# Patient Record
Sex: FEMALE | Race: WHITE | NOT HISPANIC OR LATINO
[De-identification: names, ages, dates, MRNs, and addresses within clinical notes are randomized per-mention and may not be internally consistent; named-entity substitution may affect disease eponyms.]

---

## 2019-10-22 ENCOUNTER — APPOINTMENT (OUTPATIENT)
Dept: SURGERY | Facility: CLINIC | Age: 44
End: 2019-10-22
Payer: COMMERCIAL

## 2019-10-22 VITALS
HEART RATE: 89 BPM | BODY MASS INDEX: 36.63 KG/M2 | DIASTOLIC BLOOD PRESSURE: 85 MMHG | WEIGHT: 194 LBS | HEIGHT: 61 IN | RESPIRATION RATE: 16 BRPM | OXYGEN SATURATION: 98 % | TEMPERATURE: 98.8 F | SYSTOLIC BLOOD PRESSURE: 121 MMHG

## 2019-10-22 DIAGNOSIS — Z82.3 FAMILY HISTORY OF STROKE: ICD-10-CM

## 2019-10-22 DIAGNOSIS — Z78.9 OTHER SPECIFIED HEALTH STATUS: ICD-10-CM

## 2019-10-22 DIAGNOSIS — K80.20 CALCULUS OF GALLBLADDER W/OUT CHOLECYSTITIS W/OUT OBSTRUCTION: ICD-10-CM

## 2019-10-22 DIAGNOSIS — E11.01 TYPE 2 DIABETES MELLITUS WITH HYPEROSMOLARITY WITH COMA: ICD-10-CM

## 2019-10-22 DIAGNOSIS — Z00.00 ENCOUNTER FOR GENERAL ADULT MEDICAL EXAMINATION W/OUT ABNORMAL FINDINGS: ICD-10-CM

## 2019-10-22 DIAGNOSIS — R79.89 OTHER SPECIFIED ABNORMAL FINDINGS OF BLOOD CHEMISTRY: ICD-10-CM

## 2019-10-22 DIAGNOSIS — Z82.49 FAMILY HISTORY OF ISCHEMIC HEART DISEASE AND OTHER DISEASES OF THE CIRCULATORY SYSTEM: ICD-10-CM

## 2019-10-22 PROCEDURE — 99203 OFFICE O/P NEW LOW 30 MIN: CPT

## 2019-10-22 NOTE — HISTORY OF PRESENT ILLNESS
[de-identified] : Serenity is a 43 y/o female being seen for a follow-up visit, s/p lap band in 2007. S/p abdominoplasty 2012. She was last seen in the office on 09/14/15, which was also the time of her last band adjustment. She is interested in having a revision done, but is unsure what exactly she wants. Reports intermittent abdominal "discomfort" and feeling like something "is off." Symptoms began after abdominoplasty in 2012 an have become more intense over the years. She denies having any pain, but states she is always aware of her lap band port and occasional feels a pulling sensation there. She has never noticed skin changes at the site. She has not had any new issues with her band. She notes occasional vomiting with soft foods if she eats too fast, but otherwise is tolerating it well. Her weight has been relatively stable over the past 4 years. [de-identified] : see scanned note

## 2019-10-22 NOTE — ASSESSMENT
[FreeTextEntry1] : Status post lap band with weight regain and port site pain following abdominoplasty.\par Patient wants to lose a significant amount of weight in the least invasive way possible.

## 2019-10-22 NOTE — PHYSICAL EXAM
[Obese, well nourished, in no acute distress] : obese, well nourished, in no acute distress [Normal] : no JVD, no calf tenderness, venous stasis changes, varices [de-identified] : soft, nontender, nondistended, surgical scars; lap band port palpable in the left abdomen is nontender, skin intact

## 2019-10-22 NOTE — PLAN
[FreeTextEntry1] : Fluoroscopic exam of lap band port\par Adjustment of possible\par If any question of slip or port malfunction a CAT scan will be considered\par Strict diet and exercise advice provided\par Followup 2 months.

## 2019-11-11 ENCOUNTER — APPOINTMENT (OUTPATIENT)
Dept: RADIOLOGY | Facility: HOSPITAL | Age: 44
End: 2019-11-11

## 2019-12-30 ENCOUNTER — APPOINTMENT (OUTPATIENT)
Dept: RADIOLOGY | Facility: HOSPITAL | Age: 44
End: 2019-12-30

## 2020-01-09 ENCOUNTER — APPOINTMENT (OUTPATIENT)
Dept: SURGERY | Facility: CLINIC | Age: 45
End: 2020-01-09

## 2021-02-04 ENCOUNTER — APPOINTMENT (OUTPATIENT)
Dept: SURGERY | Facility: CLINIC | Age: 46
End: 2021-02-04
Payer: COMMERCIAL

## 2021-02-04 VITALS — BODY MASS INDEX: 38.64 KG/M2 | HEIGHT: 62 IN | WEIGHT: 210 LBS

## 2021-02-04 PROCEDURE — 99213 OFFICE O/P EST LOW 20 MIN: CPT | Mod: 95

## 2021-02-04 NOTE — REASON FOR VISIT
[Home] : at home, [unfilled] , at the time of the visit. [Medical Office: ([facility])___] : at the medical office located in  [Verbal consent obtained from patient] : the patient, [unfilled] [Gastric Banding] : gastric banding [de-identified] : 11/8/2007 [de-identified] : DOLV: 10/22/2019\par \par would like to get lap band adjustment

## 2021-02-04 NOTE — HISTORY OF PRESENT ILLNESS
[Procedure: ___] : Procedure performed: [unfilled]  [Date of Surgery: ___] : Date of Surgery:   [unfilled] [Surgeon Name:   ___] : Surgeon Name: Dr. DARLING [Pre-Op Weight ___] : Pre-op weight was [unfilled] lbs [___ Years Post Op] : [unfilled] years [Phase 4] : Phase 4 [___Kcal] : [unfilled] Kcal [___gm Protein] : [unfilled] gm protein [Walking] : walking

## 2021-02-04 NOTE — ASSESSMENT
[___ Years Post Op] : [unfilled] years [Previous Visit Weight: ___] : Previous visit weight: [unfilled] lbs [Today's Weight: ___] : Today's weight:[unfilled] lbs [de-identified] : Pt unable to exercise and has gained weight over past months.  Needs lap band adjustment under fluoroscopy due to not being seen for 18 months\par 1. Walking 30 min/day\par 2. Portion sizes\par 3. Vitamin D AND B12 supplements\par Follow-up 3 months after adjustment

## 2021-04-12 ENCOUNTER — APPOINTMENT (OUTPATIENT)
Dept: RADIOLOGY | Facility: HOSPITAL | Age: 46
End: 2021-04-12
Payer: COMMERCIAL

## 2021-04-12 ENCOUNTER — OUTPATIENT (OUTPATIENT)
Dept: OUTPATIENT SERVICES | Facility: HOSPITAL | Age: 46
LOS: 1 days | End: 2021-04-12
Payer: COMMERCIAL

## 2021-04-12 DIAGNOSIS — E03.9 HYPOTHYROIDISM, UNSPECIFIED: ICD-10-CM

## 2021-04-12 DIAGNOSIS — R79.89 OTHER SPECIFIED ABNORMAL FINDINGS OF BLOOD CHEMISTRY: ICD-10-CM

## 2021-04-12 DIAGNOSIS — E66.01 MORBID (SEVERE) OBESITY DUE TO EXCESS CALORIES: ICD-10-CM

## 2021-04-12 DIAGNOSIS — Z98.84 BARIATRIC SURGERY STATUS: ICD-10-CM

## 2021-04-12 DIAGNOSIS — E11.01 TYPE 2 DIABETES MELLITUS WITH HYPEROSMOLARITY WITH COMA: ICD-10-CM

## 2021-04-12 PROCEDURE — 74240 X-RAY XM UPR GI TRC 1CNTRST: CPT

## 2021-04-12 PROCEDURE — 43999 UNLISTED PROCEDURE STOMACH: CPT

## 2021-04-12 PROCEDURE — 74240 X-RAY XM UPR GI TRC 1CNTRST: CPT | Mod: 26

## 2021-04-12 PROCEDURE — 77002 NEEDLE LOCALIZATION BY XRAY: CPT

## 2021-04-13 LAB
25(OH)D3 SERPL-MCNC: 35.1 NG/ML
ALBUMIN SERPL ELPH-MCNC: 4.5 G/DL
ALP BLD-CCNC: 118 U/L
ALT SERPL-CCNC: 26 U/L
ANION GAP SERPL CALC-SCNC: 19 MMOL/L
AST SERPL-CCNC: 18 U/L
BASOPHILS # BLD AUTO: 0.08 K/UL
BASOPHILS NFR BLD AUTO: 0.7 %
BILIRUB SERPL-MCNC: 0.6 MG/DL
BUN SERPL-MCNC: 7 MG/DL
CALCIUM SERPL-MCNC: 9.7 MG/DL
CHLORIDE SERPL-SCNC: 102 MMOL/L
CO2 SERPL-SCNC: 21 MMOL/L
CREAT SERPL-MCNC: 0.63 MG/DL
EOSINOPHIL # BLD AUTO: 0.34 K/UL
EOSINOPHIL NFR BLD AUTO: 3.2 %
FOLATE SERPL-MCNC: 11.8 NG/ML
GLUCOSE SERPL-MCNC: 87 MG/DL
HCT VFR BLD CALC: 41.8 %
HGB BLD-MCNC: 13.1 G/DL
IMM GRANULOCYTES NFR BLD AUTO: 0.7 %
IRON SERPL-MCNC: 51 UG/DL
LYMPHOCYTES # BLD AUTO: 3.61 K/UL
LYMPHOCYTES NFR BLD AUTO: 33.7 %
MAN DIFF?: NORMAL
MCHC RBC-ENTMCNC: 27.3 PG
MCHC RBC-ENTMCNC: 31.3 GM/DL
MCV RBC AUTO: 87.1 FL
MONOCYTES # BLD AUTO: 0.6 K/UL
MONOCYTES NFR BLD AUTO: 5.6 %
NEUTROPHILS # BLD AUTO: 6 K/UL
NEUTROPHILS NFR BLD AUTO: 56.1 %
PLATELET # BLD AUTO: 394 K/UL
POTASSIUM SERPL-SCNC: 4.4 MMOL/L
PROT SERPL-MCNC: 7.8 G/DL
RBC # BLD: 4.8 M/UL
RBC # FLD: 13.2 %
SODIUM SERPL-SCNC: 142 MMOL/L
VIT B12 SERPL-MCNC: 560 PG/ML
WBC # FLD AUTO: 10.7 K/UL

## 2021-04-22 ENCOUNTER — APPOINTMENT (OUTPATIENT)
Dept: SURGERY | Facility: CLINIC | Age: 46
End: 2021-04-22
Payer: COMMERCIAL

## 2021-04-22 VITALS — WEIGHT: 225 LBS | HEIGHT: 62 IN | BODY MASS INDEX: 41.41 KG/M2

## 2021-04-22 DIAGNOSIS — E66.01 MORBID (SEVERE) OBESITY DUE TO EXCESS CALORIES: ICD-10-CM

## 2021-04-22 PROCEDURE — 99214 OFFICE O/P EST MOD 30 MIN: CPT | Mod: 95

## 2021-04-22 NOTE — REASON FOR VISIT
[Home] : at home, [unfilled] , at the time of the visit. [Medical Office: ([facility])___] : at the medical office located in  [Verbal consent obtained from patient] : the patient, [unfilled] [Gastric Banding] : gastric banding [de-identified] : 11/8/2007 [de-identified] : DOLV: 2/4/2021\par Attempt at lap band adjustment on 4/12/2021, unsuccessful. Extravasation of omnipaque, suggestive of a leak within the gastric port \par Vitamin levels all within normal range. Pt reports lapband is broken and she needs to get it removed\par Patient is interested in a 1 stage conversion to gastric sleeve.  Reviewed with her her notes from her endocrinologist who thought gastric sleeve was okay despite her history (per patient).  Patient understands that since she has been outside of the program for several years a complete reevaluation is necessary.\par She will undergo an upper GI series first and if okay will set her up for a 1 stage procedure.  If any abnormalities are noted then a two-stage procedure may be indicated.

## 2021-04-22 NOTE — ASSESSMENT
[___ Years Post Op] : [unfilled] years [Previous Visit Weight: ___] : Previous visit weight: [unfilled] lbs [Today's Weight: ___] : Today's weight:[unfilled] lbs [Today's BMI: ___] : Today's BMI: [unfilled] [de-identified] : Plan:\par Removal of lap band and port and conversion to gastric sleeve or gastric bypass as a 1 or two-stage procedure.  Patient will need to do the entire preoperative evaluation.\par \par The patient was given the following instructions:\par \par 1.	She needs a complete medical evaluation including echocardiogram, stress test, pulmonary function test and evaluation for sleep apnea.\par 2.	She needs an upper endoscopy.\par \par 3.	She needs dietary and psychological evaluations.\par \par 4.	She must attend a preoperative support group meeting.\par \par 5.	She must undergo a right upper quadrant ultrasound.\par \par The patient clearly understood that surgery would only be scheduled if there are no medical or psychiatric contraindications and a second office visit is required.  \par

## 2021-04-22 NOTE — PHYSICAL EXAM
I will SWITCH the dose or number of times a day I take the medications listed below when I get home from the hospital:  None [de-identified] : Telehealth

## 2021-04-22 NOTE — HISTORY OF PRESENT ILLNESS
[Procedure: ___] : Procedure performed: [unfilled]  [Date of Surgery: ___] : Date of Surgery:   [unfilled] [Surgeon Name:   ___] : Surgeon Name: Dr. DARLING [Pre-Op Weight ___] : Pre-op weight was [unfilled] lbs [___ Years Post Op] : [unfilled] years [Phase 4] : Phase 4 [___Kcal] : [unfilled] Kcal [___gm Protein] : [unfilled] gm protein [Walking] : walking [Diabetes] : Diabetes [Hypertension] : no Hypertension [Sleep Apnea] : no Sleep Apnea [GERD] : no GERD [Hyperlipidemia] : no Hyperlipidemia

## 2021-04-23 ENCOUNTER — NON-APPOINTMENT (OUTPATIENT)
Age: 46
End: 2021-04-23

## 2021-05-12 ENCOUNTER — RESULT REVIEW (OUTPATIENT)
Age: 46
End: 2021-05-12

## 2021-05-13 ENCOUNTER — APPOINTMENT (OUTPATIENT)
Dept: CARDIOLOGY | Facility: CLINIC | Age: 46
End: 2021-05-13
Payer: COMMERCIAL

## 2021-05-13 ENCOUNTER — NON-APPOINTMENT (OUTPATIENT)
Age: 46
End: 2021-05-13

## 2021-05-13 ENCOUNTER — APPOINTMENT (OUTPATIENT)
Dept: OBGYN | Facility: CLINIC | Age: 46
End: 2021-05-13
Payer: COMMERCIAL

## 2021-05-13 VITALS
SYSTOLIC BLOOD PRESSURE: 129 MMHG | DIASTOLIC BLOOD PRESSURE: 90 MMHG | OXYGEN SATURATION: 98 % | BODY MASS INDEX: 38.96 KG/M2 | WEIGHT: 213 LBS | HEART RATE: 90 BPM

## 2021-05-13 DIAGNOSIS — E03.9 HYPOTHYROIDISM, UNSPECIFIED: ICD-10-CM

## 2021-05-13 DIAGNOSIS — R06.00 DYSPNEA, UNSPECIFIED: ICD-10-CM

## 2021-05-13 DIAGNOSIS — R07.89 OTHER CHEST PAIN: ICD-10-CM

## 2021-05-13 PROCEDURE — 93000 ELECTROCARDIOGRAM COMPLETE: CPT

## 2021-05-13 PROCEDURE — 99244 OFF/OP CNSLTJ NEW/EST MOD 40: CPT

## 2021-05-13 PROCEDURE — 99386 PREV VISIT NEW AGE 40-64: CPT

## 2021-05-13 PROCEDURE — 99072 ADDL SUPL MATRL&STAF TM PHE: CPT

## 2021-05-13 RX ORDER — LEVOTHYROXINE SODIUM 0.15 MG/1
150 TABLET ORAL
Refills: 0 | Status: ACTIVE | COMMUNITY

## 2021-05-14 PROBLEM — R07.89 CHEST PAIN, ATYPICAL: Status: ACTIVE | Noted: 2021-05-13

## 2021-05-14 PROBLEM — E03.9 HYPOTHYROID: Status: ACTIVE | Noted: 2019-10-22

## 2021-05-14 PROBLEM — R06.00 DYSPNEA ON EXERTION: Status: ACTIVE | Noted: 2021-05-13

## 2021-05-14 NOTE — PHYSICAL EXAM
[General Appearance - Well Developed] : well developed [Normal Appearance] : normal appearance [Well Groomed] : well groomed [General Appearance - Well Nourished] : well nourished [No Deformities] : no deformities [General Appearance - In No Acute Distress] : no acute distress [Normal Conjunctiva] : the conjunctiva exhibited no abnormalities [Eyelids - No Xanthelasma] : the eyelids demonstrated no xanthelasmas [Normal Oral Mucosa] : normal oral mucosa [No Oral Pallor] : no oral pallor [No Oral Cyanosis] : no oral cyanosis [Normal Jugular Venous A Waves Present] : normal jugular venous A waves present [Normal Jugular Venous V Waves Present] : normal jugular venous V waves present [No Jugular Venous Ramon A Waves] : no jugular venous ramon A waves [Respiration, Rhythm And Depth] : normal respiratory rhythm and effort [Exaggerated Use Of Accessory Muscles For Inspiration] : no accessory muscle use [Auscultation Breath Sounds / Voice Sounds] : lungs were clear to auscultation bilaterally [Heart Rate And Rhythm] : heart rate and rhythm were normal [Heart Sounds] : normal S1 and S2 [Murmurs] : no murmurs present [Abdomen Soft] : soft [Abdomen Tenderness] : non-tender [Abdomen Mass (___ Cm)] : no abdominal mass palpated [Abnormal Walk] : normal gait [Gait - Sufficient For Exercise Testing] : the gait was sufficient for exercise testing [Nail Clubbing] : no clubbing of the fingernails [Cyanosis, Localized] : no localized cyanosis [Petechial Hemorrhages (___cm)] : no petechial hemorrhages [Skin Color & Pigmentation] : normal skin color and pigmentation [] : no rash [No Venous Stasis] : no venous stasis [Skin Lesions] : no skin lesions [No Skin Ulcers] : no skin ulcer [No Xanthoma] : no  xanthoma was observed [Oriented To Time, Place, And Person] : oriented to person, place, and time [Affect] : the affect was normal [Mood] : the mood was normal [No Anxiety] : not feeling anxious

## 2021-05-19 ENCOUNTER — OUTPATIENT (OUTPATIENT)
Dept: OUTPATIENT SERVICES | Facility: HOSPITAL | Age: 46
LOS: 1 days | End: 2021-05-19
Payer: COMMERCIAL

## 2021-05-19 ENCOUNTER — APPOINTMENT (OUTPATIENT)
Dept: RADIOLOGY | Facility: HOSPITAL | Age: 46
End: 2021-05-19
Payer: COMMERCIAL

## 2021-05-19 DIAGNOSIS — Z98.84 BARIATRIC SURGERY STATUS: ICD-10-CM

## 2021-05-19 DIAGNOSIS — R79.89 OTHER SPECIFIED ABNORMAL FINDINGS OF BLOOD CHEMISTRY: ICD-10-CM

## 2021-05-19 DIAGNOSIS — Z00.00 ENCOUNTER FOR GENERAL ADULT MEDICAL EXAMINATION WITHOUT ABNORMAL FINDINGS: ICD-10-CM

## 2021-05-19 DIAGNOSIS — E66.01 MORBID (SEVERE) OBESITY DUE TO EXCESS CALORIES: ICD-10-CM

## 2021-05-19 DIAGNOSIS — E03.9 HYPOTHYROIDISM, UNSPECIFIED: ICD-10-CM

## 2021-05-19 LAB
25(OH)D3 SERPL-MCNC: 34.6 NG/ML
ALBUMIN SERPL ELPH-MCNC: 4.2 G/DL
ALP BLD-CCNC: 100 U/L
ALT SERPL-CCNC: 20 U/L
ANION GAP SERPL CALC-SCNC: 14 MMOL/L
APTT BLD: 31.7 SEC
AST SERPL-CCNC: 13 U/L
BASOPHILS # BLD AUTO: 0.09 K/UL
BASOPHILS NFR BLD AUTO: 0.7 %
BILIRUB SERPL-MCNC: 0.5 MG/DL
BUN SERPL-MCNC: 10 MG/DL
CALCIUM SERPL-MCNC: 9.7 MG/DL
CHLORIDE SERPL-SCNC: 102 MMOL/L
CO2 SERPL-SCNC: 22 MMOL/L
CREAT SERPL-MCNC: 0.7 MG/DL
EOSINOPHIL # BLD AUTO: 0.33 K/UL
EOSINOPHIL NFR BLD AUTO: 2.5 %
ESTIMATED AVERAGE GLUCOSE: 114 MG/DL
FOLATE SERPL-MCNC: 17.4 NG/ML
GLUCOSE SERPL-MCNC: 118 MG/DL
HBA1C MFR BLD HPLC: 5.6 %
HCG SERPL QL: NEGATIVE
HCT VFR BLD CALC: 41.2 %
HGB BLD-MCNC: 13.2 G/DL
IMM GRANULOCYTES NFR BLD AUTO: 1.2 %
INR PPP: 0.95 RATIO
IRON SERPL-MCNC: 54 UG/DL
LYMPHOCYTES # BLD AUTO: 4.4 K/UL
LYMPHOCYTES NFR BLD AUTO: 33 %
MAN DIFF?: NORMAL
MCHC RBC-ENTMCNC: 27 PG
MCHC RBC-ENTMCNC: 32 GM/DL
MCV RBC AUTO: 84.4 FL
MONOCYTES # BLD AUTO: 0.79 K/UL
MONOCYTES NFR BLD AUTO: 5.9 %
NEUTROPHILS # BLD AUTO: 7.56 K/UL
NEUTROPHILS NFR BLD AUTO: 56.7 %
PAPP-A SERPL-ACNC: <1 MIU/ML
PLATELET # BLD AUTO: 421 K/UL
POTASSIUM SERPL-SCNC: 4.4 MMOL/L
PROT SERPL-MCNC: 7.2 G/DL
PT BLD: 11.3 SEC
RBC # BLD: 4.88 M/UL
RBC # FLD: 13.2 %
SODIUM SERPL-SCNC: 138 MMOL/L
TSH SERPL-ACNC: 0.71 UIU/ML
VIT B12 SERPL-MCNC: 516 PG/ML
WBC # FLD AUTO: 13.33 K/UL

## 2021-05-19 PROCEDURE — 74246 X-RAY XM UPR GI TRC 2CNTRST: CPT

## 2021-05-19 PROCEDURE — 74246 X-RAY XM UPR GI TRC 2CNTRST: CPT | Mod: 26

## 2021-05-24 LAB — VIT B1 SERPL-MCNC: 134.7 NMOL/L

## 2021-05-26 ENCOUNTER — APPOINTMENT (OUTPATIENT)
Dept: MAMMOGRAPHY | Facility: IMAGING CENTER | Age: 46
End: 2021-05-26
Payer: COMMERCIAL

## 2021-05-26 ENCOUNTER — RESULT REVIEW (OUTPATIENT)
Age: 46
End: 2021-05-26

## 2021-05-26 ENCOUNTER — OUTPATIENT (OUTPATIENT)
Dept: OUTPATIENT SERVICES | Facility: HOSPITAL | Age: 46
LOS: 1 days | End: 2021-05-26
Payer: COMMERCIAL

## 2021-05-26 ENCOUNTER — APPOINTMENT (OUTPATIENT)
Dept: ULTRASOUND IMAGING | Facility: IMAGING CENTER | Age: 46
End: 2021-05-26
Payer: COMMERCIAL

## 2021-05-26 DIAGNOSIS — Z00.8 ENCOUNTER FOR OTHER GENERAL EXAMINATION: ICD-10-CM

## 2021-05-26 PROCEDURE — 76641 ULTRASOUND BREAST COMPLETE: CPT

## 2021-05-26 PROCEDURE — 77067 SCR MAMMO BI INCL CAD: CPT | Mod: 26

## 2021-05-26 PROCEDURE — 77063 BREAST TOMOSYNTHESIS BI: CPT

## 2021-05-26 PROCEDURE — 76641 ULTRASOUND BREAST COMPLETE: CPT | Mod: 26,50

## 2021-05-26 PROCEDURE — 77063 BREAST TOMOSYNTHESIS BI: CPT | Mod: 26

## 2021-05-26 PROCEDURE — 77067 SCR MAMMO BI INCL CAD: CPT

## 2021-06-14 DIAGNOSIS — Z01.818 ENCOUNTER FOR OTHER PREPROCEDURAL EXAMINATION: ICD-10-CM

## 2021-06-15 ENCOUNTER — APPOINTMENT (OUTPATIENT)
Dept: DISASTER EMERGENCY | Facility: CLINIC | Age: 46
End: 2021-06-15

## 2021-06-18 ENCOUNTER — APPOINTMENT (OUTPATIENT)
Dept: CV DIAGNOSTICS | Facility: HOSPITAL | Age: 46
End: 2021-06-18

## 2021-06-18 ENCOUNTER — APPOINTMENT (OUTPATIENT)
Dept: CV DIAGNOSITCS | Facility: HOSPITAL | Age: 46
End: 2021-06-18

## 2021-07-07 ENCOUNTER — APPOINTMENT (OUTPATIENT)
Dept: CV DIAGNOSTICS | Facility: HOSPITAL | Age: 46
End: 2021-07-07

## 2021-07-07 ENCOUNTER — OUTPATIENT (OUTPATIENT)
Dept: OUTPATIENT SERVICES | Facility: HOSPITAL | Age: 46
LOS: 1 days | End: 2021-07-07
Payer: COMMERCIAL

## 2021-07-07 ENCOUNTER — APPOINTMENT (OUTPATIENT)
Dept: CV DIAGNOSITCS | Facility: HOSPITAL | Age: 46
End: 2021-07-07

## 2021-07-07 DIAGNOSIS — R07.89 OTHER CHEST PAIN: ICD-10-CM

## 2021-07-07 PROCEDURE — 93306 TTE W/DOPPLER COMPLETE: CPT

## 2021-07-07 PROCEDURE — 93016 CV STRESS TEST SUPVJ ONLY: CPT

## 2021-07-07 PROCEDURE — 93018 CV STRESS TEST I&R ONLY: CPT

## 2021-07-07 PROCEDURE — 93017 CV STRESS TEST TRACING ONLY: CPT

## 2021-07-07 PROCEDURE — 93306 TTE W/DOPPLER COMPLETE: CPT | Mod: 26

## 2021-07-09 NOTE — DISCUSSION/SUMMARY
[Procedure Intermediate Risk] : the procedure risk is intermediate [Additional Diagnostics Recommended] : additional diagnostics recommended [Patient Low Risk] : the patient is a low surgical risk [FreeTextEntry1] : TThe patient is a 45-year-old female hypothyroid, prior lap band awaiting gastric sleeve who gets dyspneic.\par #1 CV- normal ECG, echo and exercise stress test\par #2 Hypothyroid- continue levothyroxine\par #3 Surgery- broken lap band that needs to be removed and possible gastric sleeve. FInal clearance pending above. \par \par 7/9/21 Addendum: ECHO normal  Stress borderline BP otherwise negative\par There are no cardiac contraindications to bariatric surgery.

## 2021-07-09 NOTE — HISTORY OF PRESENT ILLNESS
[Preoperative Visit] : for a medical evaluation prior to surgery [Scheduled Procedure ___] : a [unfilled] [Date of Surgery ___] : on [unfilled] [Surgeon Name ___] : surgeon: [unfilled] [FreeTextEntry1] : Serenity is a 45-year-old female hypothyroid, prior lap band now pursuing gastric sleeve. THe band is broken and needs to be removed regardless. No exercise. No chest pain, palpitaitons but short of breath on stairs.

## 2021-07-09 NOTE — REVIEW OF SYSTEMS
[SOB] : shortness of breath [Dyspnea on exertion] : dyspnea during exertion [Negative] : Heme/Lymph [Chest Discomfort] : no chest discomfort [Lower Ext Edema] : no extremity edema [Leg Claudication] : no intermittent leg claudication [Palpitations] : no palpitations [Orthopnea] : no orthopnea [PND] : no PND [Syncope] : no syncope

## 2021-10-05 ENCOUNTER — APPOINTMENT (OUTPATIENT)
Dept: ULTRASOUND IMAGING | Facility: CLINIC | Age: 46
End: 2021-10-05
Payer: COMMERCIAL

## 2021-10-05 PROCEDURE — 76770 US EXAM ABDO BACK WALL COMP: CPT

## 2022-01-10 ENCOUNTER — APPOINTMENT (OUTPATIENT)
Dept: OPHTHALMOLOGY | Facility: CLINIC | Age: 47
End: 2022-01-10
Payer: COMMERCIAL

## 2022-01-10 ENCOUNTER — NON-APPOINTMENT (OUTPATIENT)
Age: 47
End: 2022-01-10

## 2022-01-10 PROCEDURE — 92083 EXTENDED VISUAL FIELD XM: CPT

## 2022-01-10 PROCEDURE — 99204 OFFICE O/P NEW MOD 45 MIN: CPT

## 2022-01-10 PROCEDURE — 92133 CPTRZD OPH DX IMG PST SGM ON: CPT

## 2022-01-13 ENCOUNTER — APPOINTMENT (OUTPATIENT)
Dept: SURGERY | Facility: CLINIC | Age: 47
End: 2022-01-13
Payer: COMMERCIAL

## 2022-01-13 VITALS
RESPIRATION RATE: 18 BRPM | DIASTOLIC BLOOD PRESSURE: 96 MMHG | TEMPERATURE: 97.3 F | OXYGEN SATURATION: 96 % | BODY MASS INDEX: 36.68 KG/M2 | WEIGHT: 207 LBS | HEIGHT: 63 IN | SYSTOLIC BLOOD PRESSURE: 150 MMHG | HEART RATE: 93 BPM

## 2022-01-13 DIAGNOSIS — K29.50 UNSPECIFIED CHRONIC GASTRITIS W/OUT BLEEDING: ICD-10-CM

## 2022-01-13 PROCEDURE — 99215 OFFICE O/P EST HI 40 MIN: CPT

## 2022-01-19 ENCOUNTER — OUTPATIENT (OUTPATIENT)
Dept: OUTPATIENT SERVICES | Facility: HOSPITAL | Age: 47
LOS: 1 days | End: 2022-01-19
Payer: COMMERCIAL

## 2022-01-19 VITALS
OXYGEN SATURATION: 100 % | RESPIRATION RATE: 16 BRPM | SYSTOLIC BLOOD PRESSURE: 138 MMHG | TEMPERATURE: 97 F | HEART RATE: 71 BPM | DIASTOLIC BLOOD PRESSURE: 90 MMHG | HEIGHT: 63 IN | WEIGHT: 212.08 LBS

## 2022-01-19 DIAGNOSIS — Z29.9 ENCOUNTER FOR PROPHYLACTIC MEASURES, UNSPECIFIED: ICD-10-CM

## 2022-01-19 DIAGNOSIS — E66.01 MORBID (SEVERE) OBESITY DUE TO EXCESS CALORIES: ICD-10-CM

## 2022-01-19 DIAGNOSIS — Z98.84 BARIATRIC SURGERY STATUS: ICD-10-CM

## 2022-01-19 DIAGNOSIS — Z01.818 ENCOUNTER FOR OTHER PREPROCEDURAL EXAMINATION: ICD-10-CM

## 2022-01-19 DIAGNOSIS — Z98.890 OTHER SPECIFIED POSTPROCEDURAL STATES: Chronic | ICD-10-CM

## 2022-01-19 LAB
A1C WITH ESTIMATED AVERAGE GLUCOSE RESULT: 5.8 % — HIGH (ref 4–5.6)
ALBUMIN SERPL ELPH-MCNC: 4.4 G/DL — SIGNIFICANT CHANGE UP (ref 3.3–5)
ALP SERPL-CCNC: 115 U/L — SIGNIFICANT CHANGE UP (ref 40–120)
ALT FLD-CCNC: 24 U/L — SIGNIFICANT CHANGE UP (ref 10–45)
ANION GAP SERPL CALC-SCNC: 14 MMOL/L — SIGNIFICANT CHANGE UP (ref 5–17)
AST SERPL-CCNC: 16 U/L — SIGNIFICANT CHANGE UP (ref 10–40)
BILIRUB SERPL-MCNC: 0.6 MG/DL — SIGNIFICANT CHANGE UP (ref 0.2–1.2)
BLD GP AB SCN SERPL QL: NEGATIVE — SIGNIFICANT CHANGE UP
BUN SERPL-MCNC: 11 MG/DL — SIGNIFICANT CHANGE UP (ref 7–23)
CALCIUM SERPL-MCNC: 9.4 MG/DL — SIGNIFICANT CHANGE UP (ref 8.4–10.5)
CHLORIDE SERPL-SCNC: 102 MMOL/L — SIGNIFICANT CHANGE UP (ref 96–108)
CO2 SERPL-SCNC: 22 MMOL/L — SIGNIFICANT CHANGE UP (ref 22–31)
CREAT SERPL-MCNC: 0.59 MG/DL — SIGNIFICANT CHANGE UP (ref 0.5–1.3)
ESTIMATED AVERAGE GLUCOSE: 120 MG/DL — HIGH (ref 68–114)
GLUCOSE SERPL-MCNC: 93 MG/DL — SIGNIFICANT CHANGE UP (ref 70–99)
HCT VFR BLD CALC: 42.6 % — SIGNIFICANT CHANGE UP (ref 34.5–45)
HGB BLD-MCNC: 13.2 G/DL — SIGNIFICANT CHANGE UP (ref 11.5–15.5)
MCHC RBC-ENTMCNC: 26.6 PG — LOW (ref 27–34)
MCHC RBC-ENTMCNC: 31 GM/DL — LOW (ref 32–36)
MCV RBC AUTO: 85.9 FL — SIGNIFICANT CHANGE UP (ref 80–100)
NRBC # BLD: 0 /100 WBCS — SIGNIFICANT CHANGE UP (ref 0–0)
PLATELET # BLD AUTO: 333 K/UL — SIGNIFICANT CHANGE UP (ref 150–400)
POTASSIUM SERPL-MCNC: 4.1 MMOL/L — SIGNIFICANT CHANGE UP (ref 3.5–5.3)
POTASSIUM SERPL-SCNC: 4.1 MMOL/L — SIGNIFICANT CHANGE UP (ref 3.5–5.3)
PROT SERPL-MCNC: 7.9 G/DL — SIGNIFICANT CHANGE UP (ref 6–8.3)
RBC # BLD: 4.96 M/UL — SIGNIFICANT CHANGE UP (ref 3.8–5.2)
RBC # FLD: 13.4 % — SIGNIFICANT CHANGE UP (ref 10.3–14.5)
RH IG SCN BLD-IMP: NEGATIVE — SIGNIFICANT CHANGE UP
SODIUM SERPL-SCNC: 138 MMOL/L — SIGNIFICANT CHANGE UP (ref 135–145)
WBC # BLD: 10.3 K/UL — SIGNIFICANT CHANGE UP (ref 3.8–10.5)
WBC # FLD AUTO: 10.3 K/UL — SIGNIFICANT CHANGE UP (ref 3.8–10.5)

## 2022-01-19 PROCEDURE — 85027 COMPLETE CBC AUTOMATED: CPT

## 2022-01-19 PROCEDURE — 86850 RBC ANTIBODY SCREEN: CPT

## 2022-01-19 PROCEDURE — 80053 COMPREHEN METABOLIC PANEL: CPT

## 2022-01-19 PROCEDURE — G0463: CPT

## 2022-01-19 PROCEDURE — 36415 COLL VENOUS BLD VENIPUNCTURE: CPT

## 2022-01-19 PROCEDURE — 86900 BLOOD TYPING SEROLOGIC ABO: CPT

## 2022-01-19 PROCEDURE — 86901 BLOOD TYPING SEROLOGIC RH(D): CPT

## 2022-01-19 PROCEDURE — 83036 HEMOGLOBIN GLYCOSYLATED A1C: CPT

## 2022-01-19 RX ORDER — CEFAZOLIN SODIUM 1 G
2000 VIAL (EA) INJECTION ONCE
Refills: 0 | Status: DISCONTINUED | OUTPATIENT
Start: 2022-02-09 | End: 2022-02-10

## 2022-01-19 RX ORDER — CHLORHEXIDINE GLUCONATE 213 G/1000ML
1 SOLUTION TOPICAL ONCE
Refills: 0 | Status: DISCONTINUED | OUTPATIENT
Start: 2022-02-09 | End: 2022-02-10

## 2022-01-19 NOTE — H&P PST ADULT - CARDIOVASCULAR SYMPTOMS
CROW when climbing 1 flight of stairs; Denies chest pain, CROW when walking 2-3 blocks, dyspnea, orthopnea, syncope or palpitations

## 2022-01-19 NOTE — H&P PST ADULT - HISTORY OF PRESENT ILLNESS
46 year old female with PMH Obesity (BMI 37.6), Laparoscopic Gastric Band (2008), Abdominoplasty (2012), Hypothyroidism and Lap. Cholecystectomy (~ 2005) reports that she has had difficulty losing weight and said that she recently had difficulty getting the lap band inflated. Pt now presents to PST for scheduled Robotic-Assisted Removal of Lap Band Port with conversion to laparoscopic sleeve gastrectomy on 2/9/22 with Dr. Ding.    Covid vaccine Pfizer 2nd dose received 5/10/21  Covid PCR test scheduled for 2/6/22 at [facility]  Denies Recent travel, Exposure or Covid symptoms

## 2022-01-19 NOTE — H&P PST ADULT - NEGATIVE CARDIOVASCULAR SYMPTOMS
no chest pain/no palpitations/no dyspnea on exertion no chest pain/no palpitations/no orthopnea/no paroxysmal nocturnal dyspnea/no peripheral edema/no claudication

## 2022-01-19 NOTE — H&P PST ADULT - ASSESSMENT
ELFEGOI VTE 2.0 SCORE [CLOT updated 2019]    AGE RELATED RISK FACTORS                                                       MOBILITY RELATED FACTORS  [X ] Age 41-60 years                                            (1 Point)                    [ ] Bed rest                                                        (1 Point)  [ ] Age: 61-74 years                                           (2 Points)                  [ ] Plaster cast                                                   (2 Points)  [ ] Age= 75 years                                              (3 Points)                    [ ] Bed bound for more than 72 hours                 (2 Points)    DISEASE RELATED RISK FACTORS                                               GENDER SPECIFIC FACTORS  [ ] Edema in the lower extremities                       (1 Point)              [ ] Pregnancy                                                     (1 Point)  [ ] Varicose veins                                               (1 Point)                     [ ] Post-partum < 6 weeks                                   (1 Point)             [X ] BMI > 25 Kg/m2                                            (1 Point)                     [ ] Hormonal therapy  or oral contraception          (1 Point)                 [ ] Sepsis (in the previous month)                        (1 Point)               [ ] History of pregnancy complications                 (1 point)  [ ] Pneumonia or serious lung disease                                               [ ] Unexplained or recurrent                     (1 Point)           (in the previous month)                               (1 Point)  [ ] Abnormal pulmonary function test                     (1 Point)                 SURGERY RELATED RISK FACTORS  [ ] Acute myocardial infarction                              (1 Point)               [ ]  Section                                             (1 Point)  [ ] Congestive heart failure (in the previous month)  (1 Point)      [ ] Minor surgery                                                  (1 Point)   [ ] Inflammatory bowel disease                             (1 Point)               [ ] Arthroscopic surgery                                        (2 Points)  [ ] Central venous access                                      (2 Points)                [X ] General surgery lasting more than 45 minutes (2 points)  [ ] Malignancy- Present or previous                   (2 Points)                [ ] Elective arthroplasty                                         (5 points)    [ ] Stroke (in the previous month)                          (5 Points)                                                                                                                                                           HEMATOLOGY RELATED FACTORS                                                 TRAUMA RELATED RISK FACTORS  [ ] Prior episodes of VTE                                     (3 Points)                [ ] Fracture of the hip, pelvis, or leg                       (5 Points)  [ ] Positive family history for VTE                         (3 Points)             [ ] Acute spinal cord injury (in the previous month)  (5 Points)  [ ] Prothrombin 68678 A                                     (3 Points)               [ ] Paralysis  (less than 1 month)                             (5 Points)  [ ] Factor V Leiden                                             (3 Points)                  [ ] Multiple Trauma within 1 month                        (5 Points)  [ ] Lupus anticoagulants                                     (3 Points)                                                           [ ] Anticardiolipin antibodies                               (3 Points)                                                       [ ] High homocysteine in the blood                      (3 Points)                                             [ ] Other congenital or acquired thrombophilia      (3 Points)                                                [ ] Heparin induced thrombocytopenia                  (3 Points)                                     Total Score [     4     ]

## 2022-01-19 NOTE — H&P PST ADULT - SYMPTOMS
none CROW when climbing 1 flight of stairs; Denies chest pain, CROW when walking 2-3 blocks, dyspnea, orthopnea, syncope or palpitations

## 2022-01-19 NOTE — H&P PST ADULT - RS GEN HX ROS MEA POS PC
CROW when climbing 1 flight of stairs; Denies chest pain, CRWO when walking 2-3 blocks, dyspnea, orthopnea, syncope or palpitations

## 2022-01-19 NOTE — H&P PST ADULT - NSICDXPASTSURGICALHX_GEN_ALL_CORE_FT
PAST SURGICAL HISTORY:  History of cholecystectomy possibly 2005    History of suburethral sling procedure 2010    Hx of laparoscopic gastric banding around 2008    S/P abdominoplasty 2012

## 2022-01-19 NOTE — H&P PST ADULT - FALL HARM RISK - UNIVERSAL INTERVENTIONS
Bed in lowest position, wheels locked, appropriate side rails in place/Call bell, personal items and telephone in reach/Instruct patient to call for assistance before getting out of bed or chair/Non-slip footwear when patient is out of bed/[place] to call system/Physically safe environment - no spills, clutter or unnecessary equipment/Purposeful Proactive Rounding/Room/bathroom lighting operational, light cord in reach

## 2022-01-19 NOTE — H&P PST ADULT - PROBLEM SELECTOR PLAN 1
Pt is scheduled for a Robotic-Assisted Removal of Lap band port with conversion to laparoscopic vertical sleeve gastrectomy on 2/9/22 with Dr. Toña Santana PCR test scheduled for 2/6/22 at [facility]  CBC, CMP, HGA1C, T/S ordered and obtained at [facility]  ABO, FS, Urine HCG on admit  Heparin ordered pre-operatively on DOS   Medication Reconciliation e-mailed to Pharmacist Devon Burger   Incentive spirometer instructions provided to pt with teach back demonstration

## 2022-01-19 NOTE — H&P PST ADULT - NSICDXPASTMEDICALHX_GEN_ALL_CORE_FT
PAST MEDICAL HISTORY:  Adult hypothyroidism     Adult-onset obesity     Chronic cystitis     Gastric band malfunction     History of papilledema Right eye; Followed by an opthomologist     PAST MEDICAL HISTORY:  Adult hypothyroidism     Adult-onset obesity BMI 37.6    Chronic cystitis on macrobid daily    Gastric band malfunction     History of papilledema Right eye; Followed by an opthomologist - No meds

## 2022-01-24 PROBLEM — K95.09 OTHER COMPLICATIONS OF GASTRIC BAND PROCEDURE: Chronic | Status: ACTIVE | Noted: 2022-01-19

## 2022-01-24 PROBLEM — N30.20 OTHER CHRONIC CYSTITIS WITHOUT HEMATURIA: Chronic | Status: ACTIVE | Noted: 2022-01-19

## 2022-01-24 PROBLEM — Z86.69 PERSONAL HISTORY OF OTHER DISEASES OF THE NERVOUS SYSTEM AND SENSE ORGANS: Chronic | Status: ACTIVE | Noted: 2022-01-19

## 2022-02-06 ENCOUNTER — OUTPATIENT (OUTPATIENT)
Dept: OUTPATIENT SERVICES | Facility: HOSPITAL | Age: 47
LOS: 1 days | End: 2022-02-06
Payer: COMMERCIAL

## 2022-02-06 DIAGNOSIS — Z11.52 ENCOUNTER FOR SCREENING FOR COVID-19: ICD-10-CM

## 2022-02-06 DIAGNOSIS — Z98.890 OTHER SPECIFIED POSTPROCEDURAL STATES: Chronic | ICD-10-CM

## 2022-02-06 LAB — SARS-COV-2 RNA SPEC QL NAA+PROBE: SIGNIFICANT CHANGE UP

## 2022-02-06 PROCEDURE — C9803: CPT

## 2022-02-06 PROCEDURE — U0003: CPT

## 2022-02-06 PROCEDURE — U0005: CPT

## 2022-02-07 NOTE — PHARMACOTHERAPY INTERVENTION NOTE - COMMENTS
Sleeve gastrectomy scheduled for 2/9/2022.  Patient medication reconciliation completed. Patient currently taking:     Home Medications:  Macrobid 100 mg oral capsule: 1 cap(s) orally 2 times a day   Synthroid 150 mcg (0.15 mg) oral tablet: 1 tab(s) orally once a day   Vitamin D3 125 mcg (5000 intl units) oral tablet: 1 tab(s) orally once a week     Patient was instructed to use crushed, dissolvable, chewable, or liquid formulations of medications for 1 month after surgery. Patient was informed to take daily multivitamins post surgically. Patient reeducated on NSAID avoidance (ibuprofen, ASA, naproxen, aleve) as they increase risk of GI bleeding; may use APAP for mild pain otherwise contact prescriber for consult. Patient was informed on indications and directions for administration for hyoscyamine SL, acetaminophen liquid, ondansetron ODT, and omeprazole DR. Patient was instructed to take the medications as follows:     Switch macrobid caps to nitrofurantoin 25mg/5mL - 20mL PO daily (100mg) - Rx sent by Dr. Thompson (urology), fosfomycin 3g packets as alternative to nitrofurantoin liquid if patient does not tolerate  Crush synthroid tabs  Continue vitamins/supplements in chewable/dissolvable forms     Devon Burger, DanielD, BCPS  [phone]  Available on [employer] Sleeve gastrectomy scheduled for 2/9/2022.  Patient medication reconciliation completed. Patient currently taking:     Home Medications:  Macrobid 100 mg oral capsule: 1 cap(s) orally once a day   Synthroid 150 mcg (0.15 mg) oral tablet: 1 tab(s) orally once a day   Vitamin D3 125 mcg (5000 intl units) oral tablet: 1 tab(s) orally once a week     Patient was instructed to use crushed, dissolvable, chewable, or liquid formulations of medications for 1 month after surgery. Patient was informed to take daily multivitamins post surgically. Patient reeducated on NSAID avoidance (ibuprofen, ASA, naproxen, aleve) as they increase risk of GI bleeding; may use APAP for mild pain otherwise contact prescriber for consult. Patient was informed on indications and directions for administration for hyoscyamine SL, acetaminophen liquid, ondansetron ODT, and omeprazole DR. Patient was instructed to take the medications as follows:     Switch macrobid caps to nitrofurantoin 25mg/5mL - 20mL PO daily (100mg) - Rx sent by Dr. Thompson (urology), fosfomycin 3g packets as alternative to nitrofurantoin liquid if patient does not tolerate  Crush synthroid tabs  Continue vitamins/supplements in chewable/dissolvable forms     Daniel DouglasD, BCPS  [phone]  Available on [employer] Teams

## 2022-02-08 ENCOUNTER — TRANSCRIPTION ENCOUNTER (OUTPATIENT)
Age: 47
End: 2022-02-08

## 2022-02-09 ENCOUNTER — APPOINTMENT (OUTPATIENT)
Dept: SURGERY | Facility: HOSPITAL | Age: 47
End: 2022-02-09
Payer: COMMERCIAL

## 2022-02-09 ENCOUNTER — RESULT REVIEW (OUTPATIENT)
Age: 47
End: 2022-02-09

## 2022-02-09 ENCOUNTER — INPATIENT (INPATIENT)
Facility: HOSPITAL | Age: 47
LOS: 0 days | Discharge: HOME | DRG: 328 | End: 2022-02-10
Attending: SURGERY | Admitting: SURGERY
Payer: COMMERCIAL

## 2022-02-09 VITALS
TEMPERATURE: 98 F | HEART RATE: 80 BPM | RESPIRATION RATE: 18 BRPM | HEIGHT: 62.99 IN | WEIGHT: 205.03 LBS | SYSTOLIC BLOOD PRESSURE: 121 MMHG | DIASTOLIC BLOOD PRESSURE: 79 MMHG | OXYGEN SATURATION: 98 %

## 2022-02-09 DIAGNOSIS — Z98.84 BARIATRIC SURGERY STATUS: ICD-10-CM

## 2022-02-09 DIAGNOSIS — E66.01 MORBID (SEVERE) OBESITY DUE TO EXCESS CALORIES: ICD-10-CM

## 2022-02-09 DIAGNOSIS — Z98.890 OTHER SPECIFIED POSTPROCEDURAL STATES: Chronic | ICD-10-CM

## 2022-02-09 LAB
ANION GAP SERPL CALC-SCNC: 15 MMOL/L — SIGNIFICANT CHANGE UP (ref 5–17)
BASOPHILS # BLD AUTO: 0.03 K/UL — SIGNIFICANT CHANGE UP (ref 0–0.2)
BASOPHILS NFR BLD AUTO: 0.2 % — SIGNIFICANT CHANGE UP (ref 0–2)
BUN SERPL-MCNC: 10 MG/DL — SIGNIFICANT CHANGE UP (ref 7–23)
CALCIUM SERPL-MCNC: 9.1 MG/DL — SIGNIFICANT CHANGE UP (ref 8.4–10.5)
CHLORIDE SERPL-SCNC: 102 MMOL/L — SIGNIFICANT CHANGE UP (ref 96–108)
CO2 SERPL-SCNC: 22 MMOL/L — SIGNIFICANT CHANGE UP (ref 22–31)
CREAT SERPL-MCNC: 0.73 MG/DL — SIGNIFICANT CHANGE UP (ref 0.5–1.3)
EOSINOPHIL # BLD AUTO: 0.03 K/UL — SIGNIFICANT CHANGE UP (ref 0–0.5)
EOSINOPHIL NFR BLD AUTO: 0.2 % — SIGNIFICANT CHANGE UP (ref 0–6)
GLUCOSE BLDC GLUCOMTR-MCNC: 100 MG/DL — HIGH (ref 70–99)
GLUCOSE SERPL-MCNC: 149 MG/DL — HIGH (ref 70–99)
HCG UR QL: NEGATIVE — SIGNIFICANT CHANGE UP
HCT VFR BLD CALC: 45.3 % — HIGH (ref 34.5–45)
HGB BLD-MCNC: 14 G/DL — SIGNIFICANT CHANGE UP (ref 11.5–15.5)
IMM GRANULOCYTES NFR BLD AUTO: 0.6 % — SIGNIFICANT CHANGE UP (ref 0–1.5)
LYMPHOCYTES # BLD AUTO: 1.41 K/UL — SIGNIFICANT CHANGE UP (ref 1–3.3)
LYMPHOCYTES # BLD AUTO: 7.5 % — LOW (ref 13–44)
MAGNESIUM SERPL-MCNC: 2.5 MG/DL — SIGNIFICANT CHANGE UP (ref 1.6–2.6)
MCHC RBC-ENTMCNC: 27 PG — SIGNIFICANT CHANGE UP (ref 27–34)
MCHC RBC-ENTMCNC: 30.9 GM/DL — LOW (ref 32–36)
MCV RBC AUTO: 87.5 FL — SIGNIFICANT CHANGE UP (ref 80–100)
MONOCYTES # BLD AUTO: 0.32 K/UL — SIGNIFICANT CHANGE UP (ref 0–0.9)
MONOCYTES NFR BLD AUTO: 1.7 % — LOW (ref 2–14)
NEUTROPHILS # BLD AUTO: 16.81 K/UL — HIGH (ref 1.8–7.4)
NEUTROPHILS NFR BLD AUTO: 89.8 % — HIGH (ref 43–77)
NRBC # BLD: 0 /100 WBCS — SIGNIFICANT CHANGE UP (ref 0–0)
PHOSPHATE SERPL-MCNC: 2.5 MG/DL — SIGNIFICANT CHANGE UP (ref 2.5–4.5)
PLATELET # BLD AUTO: 305 K/UL — SIGNIFICANT CHANGE UP (ref 150–400)
POTASSIUM SERPL-MCNC: 4.1 MMOL/L — SIGNIFICANT CHANGE UP (ref 3.5–5.3)
POTASSIUM SERPL-SCNC: 4.1 MMOL/L — SIGNIFICANT CHANGE UP (ref 3.5–5.3)
RBC # BLD: 5.18 M/UL — SIGNIFICANT CHANGE UP (ref 3.8–5.2)
RBC # FLD: 13.2 % — SIGNIFICANT CHANGE UP (ref 10.3–14.5)
SODIUM SERPL-SCNC: 139 MMOL/L — SIGNIFICANT CHANGE UP (ref 135–145)
WBC # BLD: 18.71 K/UL — HIGH (ref 3.8–10.5)
WBC # FLD AUTO: 18.71 K/UL — HIGH (ref 3.8–10.5)

## 2022-02-09 PROCEDURE — 43774 LAP RMVL GASTR ADJ ALL PARTS: CPT

## 2022-02-09 PROCEDURE — 43775 LAP SLEEVE GASTRECTOMY: CPT

## 2022-02-09 PROCEDURE — 88305 TISSUE EXAM BY PATHOLOGIST: CPT | Mod: 26

## 2022-02-09 DEVICE — CLIP APPLIER COVIDIEN ENDOCLIP II 10MM MED/LG: Type: IMPLANTABLE DEVICE | Status: FUNCTIONAL

## 2022-02-09 DEVICE — STAPLER COVIDIEN TRI-STAPLE 60MM PURPLE RELOAD: Type: IMPLANTABLE DEVICE | Status: FUNCTIONAL

## 2022-02-09 DEVICE — TISSEEL 4ML: Type: IMPLANTABLE DEVICE | Status: FUNCTIONAL

## 2022-02-09 DEVICE — STAPLER COVIDIEN TRI-STAPLE 45MM BLACK RELOAD: Type: IMPLANTABLE DEVICE | Status: FUNCTIONAL

## 2022-02-09 DEVICE — STAPLER COVIDIEN TRI-STAPLE 60MM BLACK INTELLIGENT RELOAD: Type: IMPLANTABLE DEVICE | Status: FUNCTIONAL

## 2022-02-09 RX ORDER — HYOSCYAMINE SULFATE 0.13 MG
0.12 TABLET ORAL EVERY 6 HOURS
Refills: 0 | Status: DISCONTINUED | OUTPATIENT
Start: 2022-02-09 | End: 2022-02-10

## 2022-02-09 RX ORDER — LIDOCAINE HCL 20 MG/ML
0.2 VIAL (ML) INJECTION ONCE
Refills: 0 | Status: DISCONTINUED | OUTPATIENT
Start: 2022-02-09 | End: 2022-02-09

## 2022-02-09 RX ORDER — ACETAMINOPHEN 500 MG
1000 TABLET ORAL ONCE
Refills: 0 | Status: COMPLETED | OUTPATIENT
Start: 2022-02-09 | End: 2022-02-09

## 2022-02-09 RX ORDER — FOLIC ACID 0.8 MG
1 TABLET ORAL ONCE
Refills: 0 | Status: COMPLETED | OUTPATIENT
Start: 2022-02-09 | End: 2022-02-09

## 2022-02-09 RX ORDER — OXYCODONE HYDROCHLORIDE 5 MG/1
2.5 TABLET ORAL EVERY 6 HOURS
Refills: 0 | Status: DISCONTINUED | OUTPATIENT
Start: 2022-02-09 | End: 2022-02-10

## 2022-02-09 RX ORDER — CEFAZOLIN SODIUM 1 G
2000 VIAL (EA) INJECTION EVERY 8 HOURS
Refills: 0 | Status: COMPLETED | OUTPATIENT
Start: 2022-02-09 | End: 2022-02-09

## 2022-02-09 RX ORDER — HEPARIN SODIUM 5000 [USP'U]/ML
5000 INJECTION INTRAVENOUS; SUBCUTANEOUS ONCE
Refills: 0 | Status: COMPLETED | OUTPATIENT
Start: 2022-02-09 | End: 2022-02-09

## 2022-02-09 RX ORDER — FOSAPREPITANT DIMEGLUMINE 150 MG/5ML
150 INJECTION, POWDER, LYOPHILIZED, FOR SOLUTION INTRAVENOUS ONCE
Refills: 0 | Status: COMPLETED | OUTPATIENT
Start: 2022-02-09 | End: 2022-02-09

## 2022-02-09 RX ORDER — ACETAMINOPHEN 500 MG
1000 TABLET ORAL ONCE
Refills: 0 | Status: COMPLETED | OUTPATIENT
Start: 2022-02-10 | End: 2022-02-10

## 2022-02-09 RX ORDER — PANTOPRAZOLE SODIUM 20 MG/1
40 TABLET, DELAYED RELEASE ORAL EVERY 24 HOURS
Refills: 0 | Status: DISCONTINUED | OUTPATIENT
Start: 2022-02-09 | End: 2022-02-10

## 2022-02-09 RX ORDER — KETOROLAC TROMETHAMINE 30 MG/ML
30 SYRINGE (ML) INJECTION EVERY 8 HOURS
Refills: 0 | Status: DISCONTINUED | OUTPATIENT
Start: 2022-02-09 | End: 2022-02-10

## 2022-02-09 RX ORDER — OXYCODONE HYDROCHLORIDE 5 MG/1
5 TABLET ORAL EVERY 6 HOURS
Refills: 0 | Status: DISCONTINUED | OUTPATIENT
Start: 2022-02-09 | End: 2022-02-10

## 2022-02-09 RX ORDER — SODIUM CHLORIDE 9 MG/ML
3 INJECTION INTRAMUSCULAR; INTRAVENOUS; SUBCUTANEOUS EVERY 8 HOURS
Refills: 0 | Status: DISCONTINUED | OUTPATIENT
Start: 2022-02-09 | End: 2022-02-09

## 2022-02-09 RX ORDER — NITROFURANTOIN MACROCRYSTAL 50 MG
1 CAPSULE ORAL
Qty: 0 | Refills: 0 | DISCHARGE

## 2022-02-09 RX ORDER — THIAMINE MONONITRATE (VIT B1) 100 MG
100 TABLET ORAL ONCE
Refills: 0 | Status: COMPLETED | OUTPATIENT
Start: 2022-02-09 | End: 2022-02-09

## 2022-02-09 RX ORDER — HEPARIN SODIUM 5000 [USP'U]/ML
5000 INJECTION INTRAVENOUS; SUBCUTANEOUS EVERY 8 HOURS
Refills: 0 | Status: DISCONTINUED | OUTPATIENT
Start: 2022-02-09 | End: 2022-02-10

## 2022-02-09 RX ORDER — SODIUM CHLORIDE 9 MG/ML
1000 INJECTION, SOLUTION INTRAVENOUS
Refills: 0 | Status: DISCONTINUED | OUTPATIENT
Start: 2022-02-09 | End: 2022-02-10

## 2022-02-09 RX ORDER — ONDANSETRON 8 MG/1
4 TABLET, FILM COATED ORAL EVERY 6 HOURS
Refills: 0 | Status: DISCONTINUED | OUTPATIENT
Start: 2022-02-09 | End: 2022-02-10

## 2022-02-09 RX ORDER — HALOPERIDOL DECANOATE 100 MG/ML
0.5 INJECTION INTRAMUSCULAR EVERY 8 HOURS
Refills: 0 | Status: DISCONTINUED | OUTPATIENT
Start: 2022-02-09 | End: 2022-02-09

## 2022-02-09 RX ORDER — SODIUM CHLORIDE 9 MG/ML
1000 INJECTION, SOLUTION INTRAVENOUS
Refills: 0 | Status: DISCONTINUED | OUTPATIENT
Start: 2022-02-09 | End: 2022-02-09

## 2022-02-09 RX ADMIN — Medication 0.12 MILLIGRAM(S): at 17:49

## 2022-02-09 RX ADMIN — Medication 30 MILLIGRAM(S): at 17:59

## 2022-02-09 RX ADMIN — PANTOPRAZOLE SODIUM 40 MILLIGRAM(S): 20 TABLET, DELAYED RELEASE ORAL at 12:04

## 2022-02-09 RX ADMIN — SODIUM CHLORIDE 150 MILLILITER(S): 9 INJECTION, SOLUTION INTRAVENOUS at 11:25

## 2022-02-09 RX ADMIN — SODIUM CHLORIDE 150 MILLILITER(S): 9 INJECTION, SOLUTION INTRAVENOUS at 22:28

## 2022-02-09 RX ADMIN — ONDANSETRON 4 MILLIGRAM(S): 8 TABLET, FILM COATED ORAL at 17:49

## 2022-02-09 RX ADMIN — Medication 100 MILLIGRAM(S): at 12:05

## 2022-02-09 RX ADMIN — Medication 400 MILLIGRAM(S): at 22:19

## 2022-02-09 RX ADMIN — Medication 0.12 MILLIGRAM(S): at 23:17

## 2022-02-09 RX ADMIN — FOSAPREPITANT DIMEGLUMINE 300 MILLIGRAM(S): 150 INJECTION, POWDER, LYOPHILIZED, FOR SOLUTION INTRAVENOUS at 12:46

## 2022-02-09 RX ADMIN — Medication 30 MILLIGRAM(S): at 22:20

## 2022-02-09 RX ADMIN — Medication 100 MILLIGRAM(S): at 23:17

## 2022-02-09 RX ADMIN — ONDANSETRON 4 MILLIGRAM(S): 8 TABLET, FILM COATED ORAL at 23:17

## 2022-02-09 RX ADMIN — HEPARIN SODIUM 5000 UNIT(S): 5000 INJECTION INTRAVENOUS; SUBCUTANEOUS at 06:44

## 2022-02-09 RX ADMIN — Medication 1 MILLIGRAM(S): at 12:46

## 2022-02-09 RX ADMIN — Medication 100 MILLIGRAM(S): at 17:44

## 2022-02-09 RX ADMIN — Medication 0.12 MILLIGRAM(S): at 12:14

## 2022-02-09 RX ADMIN — HEPARIN SODIUM 5000 UNIT(S): 5000 INJECTION INTRAVENOUS; SUBCUTANEOUS at 22:20

## 2022-02-09 RX ADMIN — ONDANSETRON 4 MILLIGRAM(S): 8 TABLET, FILM COATED ORAL at 12:05

## 2022-02-09 RX ADMIN — SODIUM CHLORIDE 250 MILLILITER(S): 9 INJECTION, SOLUTION INTRAVENOUS at 12:47

## 2022-02-09 RX ADMIN — Medication 400 MILLIGRAM(S): at 17:44

## 2022-02-09 NOTE — CHART NOTE - NSCHARTNOTEFT_GEN_A_CORE
POST-OPERATIVE CHECK    SUBJECTIVE  Patient is s/p robot-assisted laparoscopic removal of gastric band with conversion to sleeve gastrectomy. Pt c/o abd pain. Denies flatus. Denies nausea, vomiting. Tolerating sips.     Vital Signs Last 24 Hrs  T(C): 36.2 (09 Feb 2022 12:00), Max: 36.4 (09 Feb 2022 06:38)  T(F): 97.2 (09 Feb 2022 12:00), Max: 97.5 (09 Feb 2022 06:38)  HR: 101 (09 Feb 2022 14:00) (80 - 101)  BP: 142/65 (09 Feb 2022 14:00) (119/59 - 144/63)  BP(mean): 94 (09 Feb 2022 14:00) (83 - 108)  RR: 18 (09 Feb 2022 14:00) (17 - 21)  SpO2: 96% (09 Feb 2022 14:00) (94% - 98%)  I&O's Detail    09 Feb 2022 07:01  -  09 Feb 2022 14:44  --------------------------------------------------------  IN:    Lactated Ringers: 450 mL  Total IN: 450 mL    OUT:    Voided (mL): 350 mL  Total OUT: 350 mL    Total NET: 100 mL        ceFAZolin   IVPB 2000  ceFAZolin   IVPB 2000  ceFAZolin   IVPB 2000  ceFAZolin   IVPB 2000  heparin   Injectable 5000    PAST MEDICAL & SURGICAL HISTORY:  Adult hypothyroidism    Adult-onset obesity  BMI 37.6    Gastric band malfunction    Chronic cystitis  on macrobid daily    History of papilledema  Right eye; Followed by an opthomologist - No meds    History of cholecystectomy  possibly 2005    Hx of laparoscopic gastric banding  around 2008    S/P abdominoplasty  2012    History of suburethral sling procedure  2010        PHYSICAL EXAM  General: NAD  Pulmonary: Nonlabored breathing, no respiratory distress  Abdominal: soft, generalized tenderness worst in epigastric area, port site dressings without strikethrough  Extremities: WWP    LABS                        14.0   18.71 )-----------( 305      ( 09 Feb 2022 11:29 )             45.3     02-09    139  |  102  |  10  ----------------------------<  149<H>  4.1   |  22  |  0.73    Ca    9.1      09 Feb 2022 11:29  Phos  2.5     02-09  Mg     2.5     02-09        CAPILLARY BLOOD GLUCOSE      POCT Blood Glucose.: 100 mg/dL (09 Feb 2022 06:29)      IMAGING    ASSESSMENT  46F with PMHx Obesity, Laparoscopic Gastric Band (2008), Abdominoplasty (2012). Now s/p removal of gastric band with conversion to sleeve gastrectomy.    PLAN  - Diet: CLD gama when tolerating sips  - Pain control as needed  - DVT ppx  - OOB and ambulating as tolerated  - F/u AM labs POST-OPERATIVE CHECK    SUBJECTIVE  Patient is s/p robot-assisted laparoscopic removal of gastric band with conversion to sleeve gastrectomy. Pt c/o abd pain. Denies flatus. Denies nausea, vomiting. Tolerating sips.     Vital Signs Last 24 Hrs  T(C): 36.2 (09 Feb 2022 12:00), Max: 36.4 (09 Feb 2022 06:38)  T(F): 97.2 (09 Feb 2022 12:00), Max: 97.5 (09 Feb 2022 06:38)  HR: 101 (09 Feb 2022 14:00) (80 - 101)  BP: 142/65 (09 Feb 2022 14:00) (119/59 - 144/63)  BP(mean): 94 (09 Feb 2022 14:00) (83 - 108)  RR: 18 (09 Feb 2022 14:00) (17 - 21)  SpO2: 96% (09 Feb 2022 14:00) (94% - 98%)  I&O's Detail    09 Feb 2022 07:01  -  09 Feb 2022 14:44  --------------------------------------------------------  IN:    Lactated Ringers: 450 mL  Total IN: 450 mL    OUT:    Voided (mL): 350 mL  Total OUT: 350 mL    Total NET: 100 mL        ceFAZolin   IVPB 2000  ceFAZolin   IVPB 2000  ceFAZolin   IVPB 2000  ceFAZolin   IVPB 2000  heparin   Injectable 5000    PAST MEDICAL & SURGICAL HISTORY:  Adult hypothyroidism    Adult-onset obesity  BMI 37.6    Gastric band malfunction    Chronic cystitis  on macrobid daily    History of papilledema  Right eye; Followed by an opthomologist - No meds    History of cholecystectomy  possibly 2005    Hx of laparoscopic gastric banding  around 2008    S/P abdominoplasty  2012    History of suburethral sling procedure  2010        PHYSICAL EXAM  General: NAD  Pulmonary: Nonlabored breathing, no respiratory distress  Abdominal: soft, generalized tenderness worst in epigastric area, port site dressings without strikethrough  Extremities: WWP    LABS                        14.0   18.71 )-----------( 305      ( 09 Feb 2022 11:29 )             45.3     02-09    139  |  102  |  10  ----------------------------<  149<H>  4.1   |  22  |  0.73    Ca    9.1      09 Feb 2022 11:29  Phos  2.5     02-09  Mg     2.5     02-09        CAPILLARY BLOOD GLUCOSE      POCT Blood Glucose.: 100 mg/dL (09 Feb 2022 06:29)      IMAGING    ASSESSMENT  46F with PMHx Obesity, Laparoscopic Gastric Band (2008), Abdominoplasty (2012). Now s/p removal of gastric band with conversion to sleeve gastrectomy.    PLAN  - Diet: advance CLD gama  - Pain control as needed  - DVT ppx  - OOB and ambulating as tolerated  - F/u AM labs    [facility]  [id number]

## 2022-02-09 NOTE — BRIEF OPERATIVE NOTE - OPERATION/FINDINGS
36" Bougie  Reinforced Staplers  LAGB removed incl all components  No HHernia  Ports x 3 x 8 mm  1x 15 mm port fascial closure

## 2022-02-09 NOTE — BRIEF OPERATIVE NOTE - NSICDXBRIEFPREOP_GEN_ALL_CORE_FT
PRE-OP DIAGNOSIS:  Morbid or severe obesity due to excess calories 09-Feb-2022 10:55:50  Juan C Rich

## 2022-02-09 NOTE — PRE-OP CHECKLIST - ORDERS/MEDICATION ADMINISTRATION RECORD ON CHART
-- DO NOT REPLY / DO NOT REPLY ALL --  -- Message is from the [facility]--    COVID-19 Universal Screening: N/A - Not about scheduling    General Patient Message      Reason for Call: Patients daughter has a questions regarding patients results for UTI. Patient will be having a CT scan but has blood in her urine and she shouldn't solution required for Ct scan.     Caller Information       Type Contact Phone    09/02/2020 09:28 AM CDT Phone (Incoming) BLAYNE PARTIDA (Emergency Contact) [phone]          Alternative phone number: n/a    Turnaround time given to caller:   \"This message will be sent to [state Provider's name]. The clinical team will fulfill your request as soon as they review your message.\"     done

## 2022-02-09 NOTE — BRIEF OPERATIVE NOTE - NSICDXBRIEFPROCEDURE_GEN_ALL_CORE_FT
PROCEDURES:  Robot-assisted laparoscopic removal of adjustable gastric restrictive device and subcutaneous port components 09-Feb-2022 10:55:00  Juan C Rich  Robot-assisted laparoscopic removal of gastric band with conversion to sleeve gastrectomy using da Enrrique Xi 09-Feb-2022 10:55:35  Juan C Rich

## 2022-02-09 NOTE — BRIEF OPERATIVE NOTE - NSICDXBRIEFPOSTOP_GEN_ALL_CORE_FT
POST-OP DIAGNOSIS:  Morbid or severe obesity due to excess calories 09-Feb-2022 10:55:59  Juan C Rich

## 2022-02-09 NOTE — PATIENT PROFILE ADULT - FALL HARM RISK - UNIVERSAL INTERVENTIONS
Bed in lowest position, wheels locked, appropriate side rails in place/Call bell, personal items and telephone in reach/Instruct patient to call for assistance before getting out of bed or chair/Non-slip footwear when patient is out of bed/Searcy to call system/Physically safe environment - no spills, clutter or unnecessary equipment/Purposeful Proactive Rounding/Room/bathroom lighting operational, light cord in reach

## 2022-02-09 NOTE — BRIEF OPERATIVE NOTE - URINE OUTPUT
Wanted to let  know that Pt was supposed to get her shots next week but woke up today with green boogies again so mother wants to hold off - Wants  to know that as soon as this clears up she will be in for boosters 
0

## 2022-02-10 ENCOUNTER — TRANSCRIPTION ENCOUNTER (OUTPATIENT)
Age: 47
End: 2022-02-10

## 2022-02-10 VITALS
SYSTOLIC BLOOD PRESSURE: 135 MMHG | HEART RATE: 63 BPM | TEMPERATURE: 97 F | DIASTOLIC BLOOD PRESSURE: 73 MMHG | OXYGEN SATURATION: 95 % | RESPIRATION RATE: 18 BRPM

## 2022-02-10 LAB
ANION GAP SERPL CALC-SCNC: 10 MMOL/L — SIGNIFICANT CHANGE UP (ref 5–17)
BASOPHILS # BLD AUTO: 0.01 K/UL — SIGNIFICANT CHANGE UP (ref 0–0.2)
BASOPHILS NFR BLD AUTO: 0.1 % — SIGNIFICANT CHANGE UP (ref 0–2)
BUN SERPL-MCNC: 8 MG/DL — SIGNIFICANT CHANGE UP (ref 7–23)
CALCIUM SERPL-MCNC: 8.8 MG/DL — SIGNIFICANT CHANGE UP (ref 8.4–10.5)
CHLORIDE SERPL-SCNC: 106 MMOL/L — SIGNIFICANT CHANGE UP (ref 96–108)
CO2 SERPL-SCNC: 24 MMOL/L — SIGNIFICANT CHANGE UP (ref 22–31)
CREAT SERPL-MCNC: 0.64 MG/DL — SIGNIFICANT CHANGE UP (ref 0.5–1.3)
EOSINOPHIL # BLD AUTO: 0 K/UL — SIGNIFICANT CHANGE UP (ref 0–0.5)
EOSINOPHIL NFR BLD AUTO: 0 % — SIGNIFICANT CHANGE UP (ref 0–6)
GLUCOSE SERPL-MCNC: 105 MG/DL — HIGH (ref 70–99)
HCT VFR BLD CALC: 35.6 % — SIGNIFICANT CHANGE UP (ref 34.5–45)
HCT VFR BLD CALC: 39.1 % — SIGNIFICANT CHANGE UP (ref 34.5–45)
HGB BLD-MCNC: 11.2 G/DL — LOW (ref 11.5–15.5)
HGB BLD-MCNC: 11.9 G/DL — SIGNIFICANT CHANGE UP (ref 11.5–15.5)
IMM GRANULOCYTES NFR BLD AUTO: 0.8 % — SIGNIFICANT CHANGE UP (ref 0–1.5)
LYMPHOCYTES # BLD AUTO: 1.97 K/UL — SIGNIFICANT CHANGE UP (ref 1–3.3)
LYMPHOCYTES # BLD AUTO: 12.5 % — LOW (ref 13–44)
MCHC RBC-ENTMCNC: 26.6 PG — LOW (ref 27–34)
MCHC RBC-ENTMCNC: 27.2 PG — SIGNIFICANT CHANGE UP (ref 27–34)
MCHC RBC-ENTMCNC: 30.4 GM/DL — LOW (ref 32–36)
MCHC RBC-ENTMCNC: 31.5 GM/DL — LOW (ref 32–36)
MCV RBC AUTO: 86.4 FL — SIGNIFICANT CHANGE UP (ref 80–100)
MCV RBC AUTO: 87.5 FL — SIGNIFICANT CHANGE UP (ref 80–100)
MONOCYTES # BLD AUTO: 0.8 K/UL — SIGNIFICANT CHANGE UP (ref 0–0.9)
MONOCYTES NFR BLD AUTO: 5.1 % — SIGNIFICANT CHANGE UP (ref 2–14)
NEUTROPHILS # BLD AUTO: 12.82 K/UL — HIGH (ref 1.8–7.4)
NEUTROPHILS NFR BLD AUTO: 81.5 % — HIGH (ref 43–77)
NRBC # BLD: 0 /100 WBCS — SIGNIFICANT CHANGE UP (ref 0–0)
NRBC # BLD: 0 /100 WBCS — SIGNIFICANT CHANGE UP (ref 0–0)
PLATELET # BLD AUTO: 302 K/UL — SIGNIFICANT CHANGE UP (ref 150–400)
PLATELET # BLD AUTO: 324 K/UL — SIGNIFICANT CHANGE UP (ref 150–400)
POTASSIUM SERPL-MCNC: 3.6 MMOL/L — SIGNIFICANT CHANGE UP (ref 3.5–5.3)
POTASSIUM SERPL-SCNC: 3.6 MMOL/L — SIGNIFICANT CHANGE UP (ref 3.5–5.3)
RBC # BLD: 4.12 M/UL — SIGNIFICANT CHANGE UP (ref 3.8–5.2)
RBC # BLD: 4.47 M/UL — SIGNIFICANT CHANGE UP (ref 3.8–5.2)
RBC # FLD: 13.2 % — SIGNIFICANT CHANGE UP (ref 10.3–14.5)
RBC # FLD: 13.3 % — SIGNIFICANT CHANGE UP (ref 10.3–14.5)
SODIUM SERPL-SCNC: 140 MMOL/L — SIGNIFICANT CHANGE UP (ref 135–145)
WBC # BLD: 14.46 K/UL — HIGH (ref 3.8–10.5)
WBC # BLD: 15.72 K/UL — HIGH (ref 3.8–10.5)
WBC # FLD AUTO: 14.46 K/UL — HIGH (ref 3.8–10.5)
WBC # FLD AUTO: 15.72 K/UL — HIGH (ref 3.8–10.5)

## 2022-02-10 PROCEDURE — 85025 COMPLETE CBC W/AUTO DIFF WBC: CPT

## 2022-02-10 PROCEDURE — C1889: CPT

## 2022-02-10 PROCEDURE — 80048 BASIC METABOLIC PNL TOTAL CA: CPT

## 2022-02-10 PROCEDURE — C9399: CPT

## 2022-02-10 PROCEDURE — 83735 ASSAY OF MAGNESIUM: CPT

## 2022-02-10 PROCEDURE — 85027 COMPLETE CBC AUTOMATED: CPT

## 2022-02-10 PROCEDURE — 81025 URINE PREGNANCY TEST: CPT

## 2022-02-10 PROCEDURE — 82962 GLUCOSE BLOOD TEST: CPT

## 2022-02-10 PROCEDURE — 36415 COLL VENOUS BLD VENIPUNCTURE: CPT

## 2022-02-10 PROCEDURE — 84100 ASSAY OF PHOSPHORUS: CPT

## 2022-02-10 PROCEDURE — 88305 TISSUE EXAM BY PATHOLOGIST: CPT

## 2022-02-10 PROCEDURE — S2900: CPT

## 2022-02-10 RX ORDER — ONDANSETRON 8 MG/1
1 TABLET, FILM COATED ORAL
Qty: 21 | Refills: 0
Start: 2022-02-10

## 2022-02-10 RX ORDER — HYOSCYAMINE SULFATE 0.13 MG
1 TABLET ORAL
Qty: 28 | Refills: 0
Start: 2022-02-10

## 2022-02-10 RX ORDER — OMEPRAZOLE 10 MG/1
1 CAPSULE, DELAYED RELEASE ORAL
Qty: 30 | Refills: 0
Start: 2022-02-10 | End: 2022-03-11

## 2022-02-10 RX ORDER — ACETAMINOPHEN 500 MG
15 TABLET ORAL
Qty: 300 | Refills: 0
Start: 2022-02-10

## 2022-02-10 RX ADMIN — Medication 400 MILLIGRAM(S): at 10:06

## 2022-02-10 RX ADMIN — ONDANSETRON 4 MILLIGRAM(S): 8 TABLET, FILM COATED ORAL at 12:51

## 2022-02-10 RX ADMIN — Medication 0.12 MILLIGRAM(S): at 12:51

## 2022-02-10 RX ADMIN — Medication 30 MILLIGRAM(S): at 05:31

## 2022-02-10 RX ADMIN — ONDANSETRON 4 MILLIGRAM(S): 8 TABLET, FILM COATED ORAL at 05:31

## 2022-02-10 RX ADMIN — Medication 0.12 MILLIGRAM(S): at 05:31

## 2022-02-10 RX ADMIN — PANTOPRAZOLE SODIUM 40 MILLIGRAM(S): 20 TABLET, DELAYED RELEASE ORAL at 12:51

## 2022-02-10 RX ADMIN — HEPARIN SODIUM 5000 UNIT(S): 5000 INJECTION INTRAVENOUS; SUBCUTANEOUS at 05:31

## 2022-02-10 RX ADMIN — Medication 400 MILLIGRAM(S): at 03:38

## 2022-02-10 NOTE — PROGRESS NOTE ADULT - SUBJECTIVE AND OBJECTIVE BOX
SURGERY DAILY PROGRESS NOTE:       SUBJECTIVE/ROS: No acute overnight events. Patient seen and examined at bedside during morning rounds.    OBJECTIVE:    Vital Signs Last 24 Hrs  T(C): 36.7 (10 Feb 2022 00:50), Max: 37.1 (09 Feb 2022 20:48)  T(F): 98.1 (10 Feb 2022 00:50), Max: 98.7 (09 Feb 2022 20:48)  HR: 81 (10 Feb 2022 00:50) (80 - 111)  BP: 126/73 (10 Feb 2022 00:50) (119/59 - 159/97)  BP(mean): 94 (09 Feb 2022 14:00) (83 - 108)  RR: 18 (10 Feb 2022 00:50) (17 - 21)  SpO2: 93% (10 Feb 2022 00:50) (92% - 98%)    I&O's Detail    09 Feb 2022 07:01  -  10 Feb 2022 04:24  --------------------------------------------------------  IN:    IV PiggyBack: 50 mL    IV PiggyBack: 200 mL    Lactated Ringers: 450 mL    Oral Fluid: 60 mL  Total IN: 760 mL    OUT:    Voided (mL): 950 mL  Total OUT: 950 mL    Total NET: -190 mL    PHYSICAL EXAM  General: NAD  Pulmonary: Nonlabored breathing, no respiratory distress  Abdominal: soft, generalized tenderness worst in epigastric area, port site dressings without strikethrough  Extremities: [facility]    LABS:                        14.0   18.71 )-----------( 305      ( 09 Feb 2022 11:29 )             45.3     02-09    139  |  102  |  10  ----------------------------<  149<H>  4.1   |  22  |  0.73    Ca    9.1      09 Feb 2022 11:29  Phos  2.5     02-09  Mg     2.5     02-09                           SURGERY DAILY PROGRESS NOTE:     SUBJECTIVE/ROS: No acute overnight events. Patient seen and examined at bedside during morning rounds.    OBJECTIVE:    Vital Signs Last 24 Hrs  T(C): 36.7 (10 Feb 2022 00:50), Max: 37.1 (09 Feb 2022 20:48)  T(F): 98.1 (10 Feb 2022 00:50), Max: 98.7 (09 Feb 2022 20:48)  HR: 81 (10 Feb 2022 00:50) (80 - 111)  BP: 126/73 (10 Feb 2022 00:50) (119/59 - 159/97)  BP(mean): 94 (09 Feb 2022 14:00) (83 - 108)  RR: 18 (10 Feb 2022 00:50) (17 - 21)  SpO2: 93% (10 Feb 2022 00:50) (92% - 98%)    I&O's Detail    09 Feb 2022 07:01  -  10 Feb 2022 04:24  --------------------------------------------------------  IN:    IV PiggyBack: 50 mL    IV PiggyBack: 200 mL    Lactated Ringers: 450 mL    Oral Fluid: 60 mL  Total IN: 760 mL    OUT:    Voided (mL): 950 mL  Total OUT: 950 mL    Total NET: -190 mL    PHYSICAL EXAM  General: NAD  Pulmonary: Nonlabored breathing, no respiratory distress  Abdominal: soft, NT, port site dressings without strikethrough  Extremities: [facility]    LABS:                        14.0   18.71 )-----------( 305      ( 09 Feb 2022 11:29 )             45.3     02-09    139  |  102  |  10  ----------------------------<  149<H>  4.1   |  22  |  0.73    Ca    9.1      09 Feb 2022 11:29  Phos  2.5     02-09  Mg     2.5     02-09

## 2022-02-10 NOTE — DISCHARGE NOTE NURSING/CASE MANAGEMENT/SOCIAL WORK - PATIENT PORTAL LINK FT
You can access the FollowMyHealth Patient Portal offered by [facility] by registering at the following website: [email or web address]. By joining [facility]’s FollowMyHealth portal, you will also be able to view your health information using other applications (apps) compatible with our system.

## 2022-02-10 NOTE — PROGRESS NOTE ADULT - ATTENDING COMMENTS
I saw and examined the patient during morning rounds, and reviewed  the history, operative findings and data with the patient and staff  Agree with note which was also reviewed and edited where appropriate.  D/W patient, RN, residents and Fellow    Doing well, POD#1 s/p RA-removal of lap band and port and conversion to LVSG.  Continue postop protocol.  Dietary changes reinforced.  Home when meets d/c criteria.  f/u appointment confirmed.

## 2022-02-10 NOTE — DISCHARGE NOTE PROVIDER - NSDCCPTREATMENT_GEN_ALL_CORE_FT
PRINCIPAL PROCEDURE  Procedure: Robot-assisted laparoscopic removal of adjustable gastric restrictive device and subcutaneous port components  Findings and Treatment: analgesia, baritric diet, increae water intake, follow up care      SECONDARY PROCEDURE  Procedure: Robot-assisted laparoscopic removal of gastric band with conversion to sleeve gastrectomy using da Enrrique Xi  Findings and Treatment:

## 2022-02-10 NOTE — DISCHARGE NOTE PROVIDER - HOSPITAL COURSE
On 2/09/22 Ms Castellon who has a history of morbid obesity BMI 36.6 underwent Laparoscopic Sleeve Gastrectomy. Bariatric ERAS protocol followed to include preoperative and perioperative use of DVT and SSI prophylaxis as well as multi-modal non-opioid analgesia. Patient tolerated the procedure well  extubated in the operating room then transferred to the PACU in stable condition. Once hemodynamically stable and she had effective pain control, she ambulated with assistance. Pt was also able to void within 4 hours following the procedure. She was later transferred to a bariatric unit and placed on bedside continuous pulse oximetry. Postoperative pain management included IV multi-modal non-opioid analgesics. The patient started tolerating bariatric clear liquid the evening of surgery.    On POD #1 patient remained stable with no acute events overnight and had effective pain control, de nies nausea and vomiting. Patient is ambulating independently and voiding as expected. The rest of her hospital course was uneventful, patient met 8-Point Bariatric Surgery D/C criteria and subsequently cleared for discharge home on POD#1. No extended VTE prophylaxis was required ([facility] VTE Risk Stratification Risk low, <1% ). The patient will follow a protocol-derived staged meal progression supervised by the dietitian in the outpatient setting. Patient will follow-up with Dr. Ding in 7-10 days, medical doctor and dietitian in 30 days. [facility] from [facility] prior to discharge. Written discharge instruction explained and given to include VTE prevention.   On 2/09/22 Ms Castellon who has a history of morbid obesity BMI 36.6 underwent Laparoscopic Removal of Gastric Band to Sleeve Gastrectomy. Bariatric ERAS protocol followed to include preoperative and perioperative use of DVT and SSI prophylaxis as well as multi-modal non-opioid analgesia. Patient tolerated the procedure well  extubated in the operating room then transferred to the PACU in stable condition. Once hemodynamically stable and she had effective pain control, she ambulated with assistance. Pt was also able to void within 4 hours following the procedure. She was later transferred to a bariatric unit and placed on bedside continuous pulse oximetry. Postoperative pain management included IV multi-modal non-opioid analgesics. The patient started tolerating bariatric clear liquid the evening of surgery.    On POD #1 patient remained stable with no acute events overnight and had effective pain control, de nies nausea and vomiting. Patient is ambulating independently and voiding as expected. The rest of her hospital course was uneventful, patient met 8-Point Bariatric Surgery D/C criteria and subsequently cleared for discharge home on POD#1. No extended VTE prophylaxis was required ([facility] VTE Risk Stratification Risk low, <1% ). The patient will follow a protocol-derived staged meal progression supervised by the dietitian in the outpatient setting. Postoperative complications reviewed. Patient advised to follow-up with Dr. Ding in 7-10 days, medical doctor and dietitian in 30 days. All appropriate prescriptions obtained from [facility] prior to discharge, medication reviewed with side effect, . Written discharge instruction explained and given to include VTE prevention.

## 2022-02-10 NOTE — DIETITIAN INITIAL EVALUATION ADULT. - CHIEF COMPLAINT
This is a "46F with PMHx Obesity, Laparoscopic Gastric Band (2008), Abdominoplasty (2012). Now POD#1 s/p removal of gastric band with conversion to sleeve gastrectomy."

## 2022-02-10 NOTE — DISCHARGE NOTE PROVIDER - NSDCCPCAREPLAN_GEN_ALL_CORE_FT
PRINCIPAL DISCHARGE DIAGNOSIS  Diagnosis: Morbid (severe) obesity due to excess calories  Assessment and Plan of Treatment: recovering from surgery

## 2022-02-10 NOTE — PROGRESS NOTE ADULT - ASSESSMENT
46F with PMHx Obesity, Laparoscopic Gastric Band (2008), Abdominoplasty (2012). Now s/p removal of gastric band with conversion to sleeve gastrectomy.    PLAN  - Diet: advance CLD gama  - IVF: LR @ 150  - Pain control: Tyl / Toradol, oxy 2.5 / 5  - DVT ppx  - OOB and ambulating as tolerated  - F/u AM labs    [facility]  [id number] 46F with PMHx Obesity, Laparoscopic Gastric Band (2008), Abdominoplasty (2012). Now POD#1 s/p removal of gastric band with conversion to sleeve gastrectomy.    PLAN  - Diet: advance CLD gama  - IVF: LR @ 150  - Pain control: Tyl / Toradol, oxy 2.5 / 5  - DVT ppx  - OOB and ambulating as tolerated  - F/u AM labs    [facility]  [id number] 46F with PMHx Obesity, Laparoscopic Gastric Band (2008), Abdominoplasty (2012). Now POD#1 s/p removal of gastric band with conversion to sleeve gastrectomy.    PLAN  - Diet: advance CLD gama  - IVF: LR @ 150  - Pain control: Tyl / Toradol, oxy 2.5 / 5  - DVT ppx  - OOB and ambulating as tolerated  - F/u AM labs  - Discharge: today if tolerates liquids    [facility]  [id number]

## 2022-02-10 NOTE — DISCHARGE NOTE PROVIDER - NSDCFUADDINST_GEN_ALL_CORE_FT
Bariatric phase 1 full liquid diet starting tomorrow for 2 weeks, then thin puree for 4 weeks.  No carbonated beverages. Avoid liquids with sugar and carbohydrates. Follow up with your dietician in 30 days.      Additional instructions as explained and outlined the gastric sleeve instructions. No exercise, no heavy lifting, call office for shortness for breath chest pain, calf pain and selling, increase pain and drainage from abdominal incision sites. Bariatric full diet as described above.  Do not take any whole large pills.  Please crush medications, open granules or take suspensions. Call office [phone] for immediate medical/surgical concerns. bariatric surgery, bariatric diet, increase water intake, nutritional supplement, follow up care, physical activity.

## 2022-02-10 NOTE — PHARMACOTHERAPY INTERVENTION NOTE - COMMENTS
Spoke to patient about absorption issues with medications and the need to crush tablets or open capsules for the next 30 days. Reinforced discussion points from previous counseling. Informed patient of new prescriptions sent to pharmacy. Pill  provided.    No pre-op opioids  Post-op opioids:    PACU: 0 MME    2 Scotty: 0 MME  Home: 0 MME     Devon Burger, PharmD, BCPS  [phone]  Available on [employer] Teams

## 2022-02-10 NOTE — DISCHARGE NOTE PROVIDER - NSDCMRMEDTOKEN_GEN_ALL_CORE_FT
acetaminophen 500 mg/15 mL oral liquid: 15 milliliter(s) orally every 6 hours MDD:as needed for pain  Levsin SL 0.125 mg sublingual tablet: 1 tab(s) sublingually every 6 hours MDD:as needed for spasms/pain  Macrobid 100 mg oral capsule: 1 cap(s) orally once a day  omeprazole 40 mg oral delayed release capsule: 1 cap(s) orally once a day MDD:open capsule into applesause  ondansetron 4 mg oral disintegrating strip: 1 each orally 3 times a day MDD:as needed for nausea  Synthroid 150 mcg (0.15 mg) oral tablet: 1 tab(s) orally once a day  Vitamin D3 125 mcg (5000 intl units) oral tablet: 1 tab(s) orally once a week

## 2022-02-10 NOTE — DISCHARGE NOTE PROVIDER - CARE PROVIDER_API CALL
Marc Ding)  Surgery  [address] [place] [address] [id number]  Phone: [phone]  Fax: [phone]  Follow Up Time: 2 weeks

## 2022-02-10 NOTE — DISCHARGE NOTE PROVIDER - NSDCFUSCHEDAPPT_GEN_ALL_CORE_FT
JOAN SINGER ; 02/18/2022 ; [facility] [address]  JOAN SINGER ; 03/10/2022 ; [facility] [address]  JOAN SINGER ; 03/15/2022 ; [facility] [address]

## 2022-02-10 NOTE — DIETITIAN INITIAL EVALUATION ADULT. - OTHER INFO
Pt with multiple previous wt loss attempts per chart and was unable to lose and maintain significant wt loss. Pt with Lap band placed in 2008, noted with difficulty losing weight and getting lap band inflated. Pt now s/p lap band removal with conversion to sleeve. Per chart, pt met with outpatient RD and weighed 219.8  pounds (5/30/21). Pre-surgical wt (H&P) noted as 212pounds (1/19/22). Current wt of 204.6 pounds (2/9/22). Pt now S/P laparoscopic vertical sleeve gastrectomy. Pt denies N+V, sipping on bariatric clear liquids during RD visit. Pt with knowledge of bariatric full liquid diet and receptive to in depth review/reinforcement. Pt reports home stock of protein shakes with plans to purchase more. Pt reports plan to purchase the necessary vitamins/minerals in chewable/liquid/crushable form including a multivitamin with added elemental iron, calcium citrate with vitamin D, and sublingual B12. Pt was advised to take the multivitamin with elemental iron at least 2 hours apart from the calcium citrate with vitamin D for optimal absorption. Pt plans to schedule a follow up appointment with outpatient RD. Pt able to teach back all points discussed during interview.     Diet Education  1. Laparoscopic  sleeve gastrectomy recommendations reviewed/reinforced (discharge instruction handout references). Bariatric full liquid diet reviewed- sugar free, caffeine free, no carbonated beverages, low fat, no straws, no chewing gum, 6 small meals/day gradually increasing volume, and sipping beverages slowly, no water during meals- drink water 1 hour before or after meals, meeting hydration and protein needs. Discussed vitamin/mineral supplement compliance as discussed above. 2. RD to remain available to reinforce nutrition education as requested by patient/family/caregiver.

## 2022-02-10 NOTE — PROGRESS NOTE ADULT - SUBJECTIVE AND OBJECTIVE BOX
Post Op Day#: 1    Subjective: "Feeling good, mild discomfort. no N/V."    Objective: No acute events overnight. Continuous pulse oximetry at bedside functioning,  v/s stable, afebrile. Mild leukocytosis, afebrile.  Ambulating independently.  independently around the unit last night. Started bariatric clear liquid diet and tolerating it. Voiding as expected.                                              Vital Signs Last 24 Hrs  T(C): 36.8 (10 Feb 2022 05:34), Max: 37.1 (09 Feb 2022 20:48)  T(F): 98.2 (10 Feb 2022 05:34), Max: 98.7 (09 Feb 2022 20:48)  HR: 75 (10 Feb 2022 05:34) (75 - 111)  BP: 107/69 (10 Feb 2022 05:34) (107/69 - 159/97)  BP(mean): 94 (09 Feb 2022 14:00) (83 - 108)  RR: 18 (10 Feb 2022 05:34) (17 - 21)  SpO2: 94% (10 Feb 2022 05:34) (92% - 98%)                                               I&O's Summary    09 Feb 2022 07:01  -  10 Feb 2022 07:00  --------------------------------------------------------  IN: 2560 mL / OUT: 950 mL / NET: 1610 mL                                                                          11.9   15.72 )-----------( 324      ( 10 Feb 2022 07:09 )             39.1                                                 02-10    140  |  106  |  8   ----------------------------<  105<H>  3.6   |  24  |  0.64    Ca    8.8      10 Feb 2022 07:09  Phos  2.5     02-09  Mg     2.5     02-09      acetaminophen   IVPB .. 1000 milliGRAM(s) IV Intermittent once  ceFAZolin   IVPB 2000 milliGRAM(s) IV Intermittent once  chlorhexidine 2% Cloths 1 Application(s) Topical once  heparin   Injectable 5000 Unit(s) SubCutaneous every 8 hours  hyoscyamine SL 0.125 milliGRAM(s) SubLingual every 6 hours  lactated ringers. 1000 milliLiter(s) IV Continuous <Continuous>  LORazepam   Injectable 0.5 milliGRAM(s) IV Push once PRN  ondansetron Injectable 4 milliGRAM(s) IV Push every 6 hours  oxyCODONE    IR 2.5 milliGRAM(s) Oral every 6 hours PRN  oxyCODONE    IR 5 milliGRAM(s) Oral every 6 hours PRN  pantoprazole  Injectable 40 milliGRAM(s) IV Push every 24 hours  sodium chloride 0.9% 1000 milliLiter(s) IV Continuous <Continuous>      Physical Exam:         Lungs:  clear breath sounds b/l       Heart:  Regular rate & rhythm       Abdomen:  Soft, non-distended.  Scopes sites clean, dry and intact. + bs, - flatus, no rebound or guarding       Skin:  intact, pannus w/o rash       Extremities: + pulses, no edema, no calf tenderness, negative jeannette's     Extended VTE Risk Assessment Scores             Caprini VTE Risk Score:                                                               1-2 (low), Perioperative and Postoperative VTE prophylaxis   [facility]  VTE Predicted RISK Low:   (<1%), No post D/C.extended VTE prophylaxis      Assessment and Plan: Lap Sleeve 1Gastrectomy POD # 1    - Repeat CBC at noon  - Bariatric Clear diet today then protocol derived staged meal progression supervised by RD in outpatient setting  - DVT/GI prophylaxis, Incentive spirometry  - Ambulate as tolerated  -  Procedure specific education including diet, postoperative complications, medications and side effects, vitamins and VTE prevention. Written materials given  - Medication reviewed and reconciled, Bariatric meds obtained from [facility] prior to d/c  -  D/C home once Bariatric 8-Point d/c criteria met  -  Follow up with Dr Ding in 7-10 days, Dietitian and PMD in 30 days.      Mary Pimentel, CRISTHIAN, ANP  [phone]

## 2022-02-10 NOTE — DIETITIAN INITIAL EVALUATION ADULT. - PERTINENT LABORATORY DATA
02-10 @ 07:09: Na 140, BUN 8, Cr 0.64, <H>, K+ 3.6, Phos --, Mg --, Alk Phos --, ALT/SGPT --, AST/SGOT --, HbA1c --

## 2022-02-10 NOTE — DISCHARGE NOTE NURSING/CASE MANAGEMENT/SOCIAL WORK - NSDCPEFALRISK_GEN_ALL_CORE
For information on Fall & Injury Prevention, visit: [email or web address] OR  [email or web address] OR  https://www.cdc.gov/steadi/patient.html

## 2022-02-18 ENCOUNTER — APPOINTMENT (OUTPATIENT)
Dept: SURGERY | Facility: CLINIC | Age: 47
End: 2022-02-18
Payer: COMMERCIAL

## 2022-02-18 VITALS
RESPIRATION RATE: 17 BRPM | DIASTOLIC BLOOD PRESSURE: 83 MMHG | WEIGHT: 192.5 LBS | HEIGHT: 62 IN | HEART RATE: 88 BPM | SYSTOLIC BLOOD PRESSURE: 116 MMHG | OXYGEN SATURATION: 100 % | TEMPERATURE: 97.6 F | BODY MASS INDEX: 35.43 KG/M2

## 2022-02-18 PROCEDURE — 99024 POSTOP FOLLOW-UP VISIT: CPT

## 2022-02-18 NOTE — PLAN
[FreeTextEntry1] : Continue postop liquid diet for 2 weeks postoperatively.  Can advance to puréed foods at that time.\par Medications should be in liquid formulation if possible.\par Encouraged frequent ambulation and at least 60 ounces of fluids per day.\par Follow-up scheduled with Dr. Ding next month.

## 2022-02-18 NOTE — ASSESSMENT
[FreeTextEntry1] : 46-year-old female recovering well status post laparoscopic removal of lap band and conversion to sleeve gastrectomy with Dr. Ding.\par Reactive hypersensitivity at incision sites secondary to dressings.  No evidence of infection.

## 2022-02-18 NOTE — HISTORY OF PRESENT ILLNESS
[de-identified] : JOAN is a 46 year old female here for postop visit s/p removal of Lap-Band and port and conversion to robot-assisted laparoscopic vertical sleeve gastrectomy on 02/09/22 with Dr. Ding.\par She is overall doing well, tolerating liquid diet, reports no dysphagia or abdominal pain.  Her incisions are slightly itchy.\par She is concerned about her chronic cystitis, for which she usually takes oral Macrobid.  She is taking the liquid formulation of the antibiotic currently.

## 2022-02-18 NOTE — PHYSICAL EXAM
[Obese, well nourished, in no acute distress] : obese, well nourished, in no acute distress [Normal] : PERRL, EOMI, no conjunctival infection, anicteric [de-identified] : Normal respirations [de-identified] : Soft, nontender, nondistended.  Incisions well-healed.  There is some mild blanching erythema around the incisions without induration or drainage.  This is consistent with likely allergic response to surgical dressings.

## 2022-02-22 LAB — SURGICAL PATHOLOGY STUDY: SIGNIFICANT CHANGE UP

## 2022-03-10 ENCOUNTER — APPOINTMENT (OUTPATIENT)
Dept: SURGERY | Facility: CLINIC | Age: 47
End: 2022-03-10
Payer: COMMERCIAL

## 2022-03-10 VITALS
HEIGHT: 62 IN | OXYGEN SATURATION: 99 % | SYSTOLIC BLOOD PRESSURE: 130 MMHG | BODY MASS INDEX: 35.33 KG/M2 | WEIGHT: 192 LBS | HEART RATE: 88 BPM | TEMPERATURE: 98.6 F | DIASTOLIC BLOOD PRESSURE: 89 MMHG | RESPIRATION RATE: 18 BRPM

## 2022-03-10 DIAGNOSIS — Z09 ENCOUNTER FOR FOLLOW-UP EXAMINATION AFTER COMPLETED TREATMENT FOR CONDITIONS OTHER THAN MALIGNANT NEOPLASM: ICD-10-CM

## 2022-03-10 DIAGNOSIS — Z98.84 BARIATRIC SURGERY STATUS: ICD-10-CM

## 2022-03-10 PROCEDURE — 99024 POSTOP FOLLOW-UP VISIT: CPT

## 2022-03-10 NOTE — REASON FOR VISIT
[Gastric Sleeve] : gastric sleeve [de-identified] : 2/9/2022 [de-identified] : DOLV: 2/18/2022 with Dr. Hernandez \par \par No complaints\par Happy with results to date\par No nausea/vomiting\par No chest pain/shortness of breath\par No fever/chills

## 2022-03-10 NOTE — ASSESSMENT
[___ Months Post Op] : [unfilled] months [Previous Visit Weight: ___] : Previous visit weight: [unfilled] lbs [Cormobidities: ___] : Comorbidities: [unfilled] [Today's Weight: ___] : Today's weight:[unfilled] lbs [Today's BMI: ___] : Today's BMI: [unfilled] [de-identified] : Plan:\par Advance to phase 4\par Increase activities as tolerated\par Continue to take vitamins\par Avoid carbohydrates\par Follow-up 2 months

## 2022-03-10 NOTE — PHYSICAL EXAM
[de-identified] : anicteric, mucous membranes moist  [de-identified] : CTA  [de-identified] : RRR [de-identified] : abdomen soft nontender nondistended [de-identified] : CDI healing

## 2022-03-10 NOTE — HISTORY OF PRESENT ILLNESS
[Procedure: ___] : Procedure performed: [unfilled]  [Date of Surgery: ___] : Date of Surgery:   [unfilled] [Surgeon Name:   ___] : Surgeon Name: Dr. DARLING [Pre-Op Weight ___] : Pre-op weight was [unfilled] lbs [___ Months Post Op] : [unfilled] months [Diabetes] : Diabetes [de-identified] : Had some constipation issues, using smooth move tea with good result  [Phase 3] : Phase 3 [Walking] : walking [Hypertension] : no Hypertension [Sleep Apnea] : no Sleep Apnea [GERD] : no GERD [Hyperlipidemia] : no Hyperlipidemia

## 2022-03-15 ENCOUNTER — APPOINTMENT (OUTPATIENT)
Dept: OPHTHALMOLOGY | Facility: CLINIC | Age: 47
End: 2022-03-15
Payer: COMMERCIAL

## 2022-03-15 ENCOUNTER — NON-APPOINTMENT (OUTPATIENT)
Age: 47
End: 2022-03-15

## 2022-03-15 PROCEDURE — 92083 EXTENDED VISUAL FIELD XM: CPT

## 2022-03-15 PROCEDURE — 92133 CPTRZD OPH DX IMG PST SGM ON: CPT

## 2022-03-15 PROCEDURE — 99214 OFFICE O/P EST MOD 30 MIN: CPT

## 2022-05-12 ENCOUNTER — APPOINTMENT (OUTPATIENT)
Dept: SURGERY | Facility: CLINIC | Age: 47
End: 2022-05-12
Payer: COMMERCIAL

## 2022-05-12 VITALS
DIASTOLIC BLOOD PRESSURE: 65 MMHG | WEIGHT: 187 LBS | SYSTOLIC BLOOD PRESSURE: 128 MMHG | TEMPERATURE: 98.5 F | HEIGHT: 62 IN | HEART RATE: 65 BPM | BODY MASS INDEX: 34.41 KG/M2 | OXYGEN SATURATION: 98 % | RESPIRATION RATE: 18 BRPM

## 2022-05-12 PROCEDURE — 99212 OFFICE O/P EST SF 10 MIN: CPT

## 2022-05-12 NOTE — PHYSICAL EXAM
[de-identified] : anicteric, mucous membranes moist [de-identified] : CTA  [de-identified] : RRR  [de-identified] : abdomen soft nontender nondistended [de-identified] : Healed

## 2022-05-12 NOTE — ASSESSMENT
[___ Months Post Op] : [unfilled] months [Previous Visit Weight: ___] : Previous visit weight: [unfilled] lbs [Cormobidities: ___] : Comorbidities: [unfilled] [Today's Weight: ___] : Today's weight:[unfilled] lbs [Today's BMI: ___] : Today's BMI: [unfilled] [de-identified] : Plan:\par Rx for labs given \par Continue biotin\par Increase protein intake\par Consider Rogaine\par Follow-up 3 months\par

## 2022-05-12 NOTE — HISTORY OF PRESENT ILLNESS
[Procedure: ___] : Procedure performed: [unfilled]  [Date of Surgery: ___] : Date of Surgery:   [unfilled] [Surgeon Name:   ___] : Surgeon Name: Dr. DARLING [Pre-Op Weight ___] : Pre-op weight was [unfilled] lbs [___ Months Post Op] : [unfilled] months [Diabetes] : Diabetes [Phase 4] : Phase 4 [de-identified] : unhappy with slow weight loss, not drinking enough \par Concerned about hair loss\par Not exercising\par Not focusing on protein intake [FreeTextEntry2] : no exercise  [Hypertension] : no Hypertension [Sleep Apnea] : no Sleep Apnea [Hyperlipidemia] : no Hyperlipidemia

## 2022-05-18 LAB
25(OH)D3 SERPL-MCNC: 52.6 NG/ML
ALBUMIN SERPL ELPH-MCNC: 4.5 G/DL
ALP BLD-CCNC: 119 U/L
ALT SERPL-CCNC: 25 U/L
ANION GAP SERPL CALC-SCNC: 11 MMOL/L
AST SERPL-CCNC: 14 U/L
BASOPHILS # BLD AUTO: 0.06 K/UL
BASOPHILS NFR BLD AUTO: 0.6 %
BILIRUB SERPL-MCNC: 0.9 MG/DL
BUN SERPL-MCNC: 16 MG/DL
CALCIUM SERPL-MCNC: 9.8 MG/DL
CHLORIDE SERPL-SCNC: 105 MMOL/L
CO2 SERPL-SCNC: 26 MMOL/L
CREAT SERPL-MCNC: 0.82 MG/DL
EGFR: 88 ML/MIN/1.73M2
EOSINOPHIL # BLD AUTO: 0.26 K/UL
EOSINOPHIL NFR BLD AUTO: 2.7 %
ESTIMATED AVERAGE GLUCOSE: 114 MG/DL
FOLATE SERPL-MCNC: >20 NG/ML
GLUCOSE SERPL-MCNC: 95 MG/DL
HBA1C MFR BLD HPLC: 5.6 %
HCT VFR BLD CALC: 43.4 %
HGB BLD-MCNC: 13.3 G/DL
IMM GRANULOCYTES NFR BLD AUTO: 0.3 %
IRON SERPL-MCNC: 58 UG/DL
LYMPHOCYTES # BLD AUTO: 2.85 K/UL
LYMPHOCYTES NFR BLD AUTO: 29.4 %
MAN DIFF?: NORMAL
MCHC RBC-ENTMCNC: 27.1 PG
MCHC RBC-ENTMCNC: 30.6 GM/DL
MCV RBC AUTO: 88.4 FL
MONOCYTES # BLD AUTO: 0.6 K/UL
MONOCYTES NFR BLD AUTO: 6.2 %
NEUTROPHILS # BLD AUTO: 5.88 K/UL
NEUTROPHILS NFR BLD AUTO: 60.8 %
PLATELET # BLD AUTO: 342 K/UL
POTASSIUM SERPL-SCNC: 4.1 MMOL/L
PROT SERPL-MCNC: 7.4 G/DL
RBC # BLD: 4.91 M/UL
RBC # FLD: 13.4 %
SODIUM SERPL-SCNC: 142 MMOL/L
VIT B12 SERPL-MCNC: 1106 PG/ML
WBC # FLD AUTO: 9.68 K/UL

## 2022-05-19 LAB — ZINC SERPL-MCNC: 86 UG/DL

## 2022-05-25 LAB
VIT B1 SERPL-MCNC: 149.8 NMOL/L
VIT B6 SERPL-MCNC: 32.3 UG/L

## 2022-06-11 NOTE — DIETITIAN INITIAL EVALUATION ADULT. - ORAL INTAKE PTA/DIET HISTORY
Name band; Pt reports following a full liquid diet for 2 weeks PTA as instructed by outpatient RD. Pt reports having Premier Protein shakes, broth, sugar-fee pudding. NKFA. Pt denies chewing/swallowing difficulty, nausea, vomiting, diarrhea, constipation PTA. Was taking vitamin D 3 supplement

## 2022-07-15 ENCOUNTER — NON-APPOINTMENT (OUTPATIENT)
Age: 47
End: 2022-07-15

## 2022-07-15 ENCOUNTER — APPOINTMENT (OUTPATIENT)
Dept: OPHTHALMOLOGY | Facility: CLINIC | Age: 47
End: 2022-07-15

## 2022-07-15 PROCEDURE — 92133 CPTRZD OPH DX IMG PST SGM ON: CPT

## 2022-07-15 PROCEDURE — 92083 EXTENDED VISUAL FIELD XM: CPT

## 2022-07-15 PROCEDURE — 99214 OFFICE O/P EST MOD 30 MIN: CPT

## 2022-09-06 PROBLEM — Z13.21 ENCOUNTER FOR VITAMIN DEFICIENCY SCREENING: Status: ACTIVE | Noted: 2022-09-06

## 2022-09-08 ENCOUNTER — APPOINTMENT (OUTPATIENT)
Dept: SURGERY | Facility: CLINIC | Age: 47
End: 2022-09-08

## 2022-09-08 VITALS
TEMPERATURE: 98.4 F | HEIGHT: 62 IN | RESPIRATION RATE: 16 BRPM | WEIGHT: 176 LBS | BODY MASS INDEX: 32.39 KG/M2 | SYSTOLIC BLOOD PRESSURE: 116 MMHG | DIASTOLIC BLOOD PRESSURE: 83 MMHG | OXYGEN SATURATION: 98 % | HEART RATE: 57 BPM

## 2022-09-08 DIAGNOSIS — Z13.21 ENCOUNTER FOR SCREENING FOR NUTRITIONAL DISORDER: ICD-10-CM

## 2022-09-08 PROCEDURE — 99213 OFFICE O/P EST LOW 20 MIN: CPT

## 2022-09-08 NOTE — PHYSICAL EXAM
[de-identified] : Anicteric [de-identified] : Soft [de-identified] : Moist without breakdown [de-identified] : None

## 2022-09-08 NOTE — ASSESSMENT
[Today's Weight: ___] : Today's weight:[unfilled] lbs [Today's BMI: ___] : Today's BMI: [unfilled] [de-identified] : Plan:\par Continue omeprazole 40 mg/day for the time being\par Keep food diary\par Pouch reset X 5 days\par Increase  fluids\par Vitamins \par Walking 30 min X day 38.5

## 2022-09-08 NOTE — HISTORY OF PRESENT ILLNESS
[de-identified] : Feels like she is eating more\par Concerned about progress of weight loss although clothes are fitting looser\par Not getting much exercise [Hypertension] : no Hypertension [Sleep Apnea] : no Sleep Apnea [Hyperlipidemia] : no Hyperlipidemia

## 2022-11-04 ENCOUNTER — INPATIENT (INPATIENT)
Facility: HOSPITAL | Age: 47
LOS: 2 days | Discharge: HOME | DRG: 312 | End: 2022-11-07
Attending: INTERNAL MEDICINE | Admitting: INTERNAL MEDICINE
Payer: COMMERCIAL

## 2022-11-04 VITALS
RESPIRATION RATE: 18 BRPM | SYSTOLIC BLOOD PRESSURE: 132 MMHG | OXYGEN SATURATION: 98 % | HEIGHT: 62 IN | HEART RATE: 63 BPM | DIASTOLIC BLOOD PRESSURE: 84 MMHG | WEIGHT: 175.05 LBS | TEMPERATURE: 98 F

## 2022-11-04 DIAGNOSIS — Z98.890 OTHER SPECIFIED POSTPROCEDURAL STATES: Chronic | ICD-10-CM

## 2022-11-04 LAB
ALBUMIN SERPL ELPH-MCNC: 4.5 G/DL — SIGNIFICANT CHANGE UP (ref 3.3–5)
ALP SERPL-CCNC: 104 U/L — SIGNIFICANT CHANGE UP (ref 40–120)
ALT FLD-CCNC: 34 U/L — SIGNIFICANT CHANGE UP (ref 10–45)
ANION GAP SERPL CALC-SCNC: 12 MMOL/L — SIGNIFICANT CHANGE UP (ref 5–17)
AST SERPL-CCNC: 37 U/L — SIGNIFICANT CHANGE UP (ref 10–40)
BASOPHILS # BLD AUTO: 0.05 K/UL — SIGNIFICANT CHANGE UP (ref 0–0.2)
BASOPHILS NFR BLD AUTO: 0.5 % — SIGNIFICANT CHANGE UP (ref 0–2)
BILIRUB SERPL-MCNC: 0.6 MG/DL — SIGNIFICANT CHANGE UP (ref 0.2–1.2)
BUN SERPL-MCNC: 17 MG/DL — SIGNIFICANT CHANGE UP (ref 7–23)
CALCIUM SERPL-MCNC: 9.4 MG/DL — SIGNIFICANT CHANGE UP (ref 8.4–10.5)
CHLORIDE SERPL-SCNC: 102 MMOL/L — SIGNIFICANT CHANGE UP (ref 96–108)
CO2 SERPL-SCNC: 25 MMOL/L — SIGNIFICANT CHANGE UP (ref 22–31)
CREAT SERPL-MCNC: 0.51 MG/DL — SIGNIFICANT CHANGE UP (ref 0.5–1.3)
EGFR: 114 ML/MIN/1.73M2 — SIGNIFICANT CHANGE UP
EOSINOPHIL # BLD AUTO: 0.07 K/UL — SIGNIFICANT CHANGE UP (ref 0–0.5)
EOSINOPHIL NFR BLD AUTO: 0.6 % — SIGNIFICANT CHANGE UP (ref 0–6)
FLUAV AG NPH QL: SIGNIFICANT CHANGE UP
FLUBV AG NPH QL: SIGNIFICANT CHANGE UP
GLUCOSE SERPL-MCNC: 103 MG/DL — HIGH (ref 70–99)
HCG UR QL: NEGATIVE — SIGNIFICANT CHANGE UP
HCT VFR BLD CALC: 42.4 % — SIGNIFICANT CHANGE UP (ref 34.5–45)
HGB BLD-MCNC: 13.6 G/DL — SIGNIFICANT CHANGE UP (ref 11.5–15.5)
IMM GRANULOCYTES NFR BLD AUTO: 0.5 % — SIGNIFICANT CHANGE UP (ref 0–0.9)
LYMPHOCYTES # BLD AUTO: 1.65 K/UL — SIGNIFICANT CHANGE UP (ref 1–3.3)
LYMPHOCYTES # BLD AUTO: 14.9 % — SIGNIFICANT CHANGE UP (ref 13–44)
MAGNESIUM SERPL-MCNC: 2.1 MG/DL — SIGNIFICANT CHANGE UP (ref 1.6–2.6)
MCHC RBC-ENTMCNC: 28 PG — SIGNIFICANT CHANGE UP (ref 27–34)
MCHC RBC-ENTMCNC: 32.1 GM/DL — SIGNIFICANT CHANGE UP (ref 32–36)
MCV RBC AUTO: 87.4 FL — SIGNIFICANT CHANGE UP (ref 80–100)
MONOCYTES # BLD AUTO: 0.39 K/UL — SIGNIFICANT CHANGE UP (ref 0–0.9)
MONOCYTES NFR BLD AUTO: 3.5 % — SIGNIFICANT CHANGE UP (ref 2–14)
NEUTROPHILS # BLD AUTO: 8.88 K/UL — HIGH (ref 1.8–7.4)
NEUTROPHILS NFR BLD AUTO: 80 % — HIGH (ref 43–77)
NRBC # BLD: 0 /100 WBCS — SIGNIFICANT CHANGE UP (ref 0–0)
PHOSPHATE SERPL-MCNC: 3.8 MG/DL — SIGNIFICANT CHANGE UP (ref 2.5–4.5)
PLATELET # BLD AUTO: 282 K/UL — SIGNIFICANT CHANGE UP (ref 150–400)
POTASSIUM SERPL-MCNC: 5.1 MMOL/L — SIGNIFICANT CHANGE UP (ref 3.5–5.3)
POTASSIUM SERPL-SCNC: 5.1 MMOL/L — SIGNIFICANT CHANGE UP (ref 3.5–5.3)
PROT SERPL-MCNC: 8.6 G/DL — HIGH (ref 6–8.3)
RBC # BLD: 4.85 M/UL — SIGNIFICANT CHANGE UP (ref 3.8–5.2)
RBC # FLD: 13.2 % — SIGNIFICANT CHANGE UP (ref 10.3–14.5)
RSV RNA NPH QL NAA+NON-PROBE: SIGNIFICANT CHANGE UP
SARS-COV-2 RNA SPEC QL NAA+PROBE: SIGNIFICANT CHANGE UP
SODIUM SERPL-SCNC: 139 MMOL/L — SIGNIFICANT CHANGE UP (ref 135–145)
WBC # BLD: 11.09 K/UL — HIGH (ref 3.8–10.5)
WBC # FLD AUTO: 11.09 K/UL — HIGH (ref 3.8–10.5)

## 2022-11-04 PROCEDURE — 99218: CPT

## 2022-11-04 PROCEDURE — 71046 X-RAY EXAM CHEST 2 VIEWS: CPT | Mod: 26

## 2022-11-04 RX ORDER — NITROFURANTOIN MACROCRYSTAL 50 MG
100 CAPSULE ORAL
Refills: 0 | Status: DISCONTINUED | OUTPATIENT
Start: 2022-11-04 | End: 2022-11-05

## 2022-11-04 RX ORDER — LEVOTHYROXINE SODIUM 125 MCG
150 TABLET ORAL DAILY
Refills: 0 | Status: DISCONTINUED | OUTPATIENT
Start: 2022-11-04 | End: 2022-11-07

## 2022-11-04 RX ORDER — CHOLECALCIFEROL (VITAMIN D3) 125 MCG
1 CAPSULE ORAL
Qty: 0 | Refills: 0 | DISCHARGE

## 2022-11-04 NOTE — ED PROVIDER NOTE - OBJECTIVE STATEMENT
Patient is a 46 y/o F with PMHx sig for gastric sleeve 2022, hypothyroidism who presents to the ED with syncopal episode. 1 syncopal episodes while sitting 1 month ago. Today had similar episode where she was sitting at desk and "slumped over". No prodrome, chest pain, sob, palp, dizziness,  tremors, urinary incontinence. Witnessed by co-worker. No fall or head injury. After first syncopal ep, followed with neuro. Extensive w/u including MRIs done. Was supposed to have EEG done today. Has not followed with cardiologist. Eating/drinking well. No f/c, uri sx, urinary or bowel changes. No ingestions.

## 2022-11-04 NOTE — ED ADULT NURSE REASSESSMENT NOTE - NS ED NURSE REASSESS COMMENT FT1
18.30 Received the Pt from  LEANN Stoddard. Pt is Observed for Syncopal episode. Received the Pt A&OX 4 obeys commands Kaur N/V/D fever chills cp SOB   Comfort care & safety measures continued  IV site looks clean & dry no signs of infiltration noted pt denies  pain IV site .  Pt is advised to call for help  call bell with in the reach pt verbalized the understanding .  pending CDU  MD sharp . GCS 15/15 A&OX 4 PERRLA  size 3 Strong upper & lower extremities steady gait   No facial droop  No Hand Leg drop denies numbness tingling Continue to monitor

## 2022-11-04 NOTE — ED ADULT NURSE NOTE - CAS DISCH ACCOMP BY
Self
I will SWITCH the dose or number of times a day I take the medications listed below when I get home from the hospital:  None

## 2022-11-04 NOTE — ED CDU PROVIDER INITIAL DAY NOTE - OBJECTIVE STATEMENT
Patient is a 46 y/o F with PMHx sig for gastric sleeve 2022, hypothyroidism who presents to the ED with syncopal episode. 1 syncopal episodes while sitting 1 month ago. Today had similar episode where she was sitting at desk and "slumped over". No prodrome, chest pain, sob, palp, dizziness,  tremors, urinary incontinence. Witnessed by co-worker. No fall or head injury. After first syncopal ep, followed with neuro. Extensive w/u including MRIs done. Was supposed to have EEG done today. Has not followed with cardiologist. Eating/drinking well. No f/c, uri sx, urinary or bowel changes. No ingestions. Patient is a 48 y/o F with PMHx sig for gastric sleeve 2022, hypothyroidism who presents to the ED with syncopal episode. Pt had 1 syncopal episodes while sitting 1 month ago, but states she felt like she was going to pass out. Today had similar episode where she was sitting at desk and "slumped over" without any preceding symptoms. Denied chest pain, sob, palpitations, dizziness,  tremors, urinary incontinence. Witnessed by co-worker. No fall or head injury. After first syncopal episode pt followed up with neurology. pt states she had MRIs done which she assumes were negative. Was supposed to have EEG done today. Has not followed with cardiologist. Eating/drinking well. No f/c, uri sx, urinary or bowel changes.

## 2022-11-04 NOTE — ED ADULT NURSE NOTE - OBJECTIVE STATEMENT
47y F w/ history of gastric sleeve presents to ED w/ dizziness. Pt had a syncopal episode at work today. She was found slumped over in a chair and was told she was unconcious for two minutes. Pt denies incontinence or hitting her head. from the syncope. Pt also denies hx of seizures. This is the pt's second syncope in the past month and has been seeing a neurologist. PERRL, breath sounds clear, abdomen soft and non distended non tender, no swelling to the lower extremities. Pt denies fever, chills, chest pain, sob, abdominal pain, n/v/d, GI/ symtpoms. Pt is ambulatory.

## 2022-11-04 NOTE — ED PROVIDER NOTE - NS ED ROS FT
CONSTITUTIONAL: No fevers, no chills, + lightheadedness, no dizziness  EYES: no visual changes, no eye pain  EARS: no ear drainage, no ear pain, no change in hearing  NOSE: no nasal congestion  MOUTH/THROAT: no sore throat  CV: No chest pain, no palpitations  RESP: No SOB, no cough  GI: No n/v/d, no abd pain  : no dysuria, no hematuria, no flank pain  MSK: no back pain, no extremity pain  SKIN: no rashes  NEURO: no headache, no focal weakness, no decreased sensation/parasthesias   PSYCHIATRIC: no known mental health issues

## 2022-11-04 NOTE — ED PROVIDER NOTE - CLINICAL SUMMARY MEDICAL DECISION MAKING FREE TEXT BOX
48 y/o f w/ pmhx sig for gastric sleeve pted with 2x syncopal ep in past month. No prodrome, cp, palp, seizure-like activity. Vitals stable. Exam as above. Will get labs, ekg, cxr, ivf.

## 2022-11-04 NOTE — ED PROVIDER NOTE - NSICDXPASTMEDICALHX_GEN_ALL_CORE_FT
PAST MEDICAL HISTORY:  Adult hypothyroidism     Adult-onset obesity BMI 37.6    Chronic cystitis on macrobid daily    Gastric band malfunction     History of papilledema Right eye; Followed by an opthomologist - No meds

## 2022-11-04 NOTE — ED PROVIDER NOTE - PROGRESS NOTE DETAILS
Sienna Hernandez MD (PGY2): Lab work non-actionable. Give patient had syncopal episode x2, no cardiac f/u. Will consider CDU stay for echo.

## 2022-11-05 DIAGNOSIS — R55 SYNCOPE AND COLLAPSE: ICD-10-CM

## 2022-11-05 LAB
A1C WITH ESTIMATED AVERAGE GLUCOSE RESULT: 5.6 % — SIGNIFICANT CHANGE UP (ref 4–5.6)
ANION GAP SERPL CALC-SCNC: 10 MMOL/L — SIGNIFICANT CHANGE UP (ref 5–17)
BUN SERPL-MCNC: 15 MG/DL — SIGNIFICANT CHANGE UP (ref 7–23)
CALCIUM SERPL-MCNC: 8.8 MG/DL — SIGNIFICANT CHANGE UP (ref 8.4–10.5)
CHLORIDE SERPL-SCNC: 106 MMOL/L — SIGNIFICANT CHANGE UP (ref 96–108)
CHOLEST SERPL-MCNC: 200 MG/DL — HIGH
CO2 SERPL-SCNC: 26 MMOL/L — SIGNIFICANT CHANGE UP (ref 22–31)
CREAT SERPL-MCNC: 0.63 MG/DL — SIGNIFICANT CHANGE UP (ref 0.5–1.3)
EGFR: 109 ML/MIN/1.73M2 — SIGNIFICANT CHANGE UP
ESTIMATED AVERAGE GLUCOSE: 114 MG/DL — SIGNIFICANT CHANGE UP (ref 68–114)
GLUCOSE SERPL-MCNC: 91 MG/DL — SIGNIFICANT CHANGE UP (ref 70–99)
HDLC SERPL-MCNC: 50 MG/DL — LOW
LIPID PNL WITH DIRECT LDL SERPL: 136 MG/DL — HIGH
NON HDL CHOLESTEROL: 150 MG/DL — HIGH
POTASSIUM SERPL-MCNC: 3.5 MMOL/L — SIGNIFICANT CHANGE UP (ref 3.5–5.3)
POTASSIUM SERPL-SCNC: 3.5 MMOL/L — SIGNIFICANT CHANGE UP (ref 3.5–5.3)
SODIUM SERPL-SCNC: 142 MMOL/L — SIGNIFICANT CHANGE UP (ref 135–145)
TRIGL SERPL-MCNC: 71 MG/DL — SIGNIFICANT CHANGE UP
TSH SERPL-MCNC: 0.18 UIU/ML — LOW (ref 0.27–4.2)

## 2022-11-05 PROCEDURE — 70498 CT ANGIOGRAPHY NECK: CPT | Mod: 26

## 2022-11-05 PROCEDURE — 70496 CT ANGIOGRAPHY HEAD: CPT | Mod: 26

## 2022-11-05 PROCEDURE — 99223 1ST HOSP IP/OBS HIGH 75: CPT | Mod: GC

## 2022-11-05 PROCEDURE — 99217: CPT

## 2022-11-05 RX ORDER — ONDANSETRON 8 MG/1
4 TABLET, FILM COATED ORAL ONCE
Refills: 0 | Status: COMPLETED | OUTPATIENT
Start: 2022-11-05 | End: 2022-11-05

## 2022-11-05 RX ADMIN — Medication 150 MICROGRAM(S): at 05:51

## 2022-11-05 RX ADMIN — Medication 100 MILLIGRAM(S): at 05:51
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
## 2022-11-05 NOTE — ED CDU PROVIDER DISPOSITION NOTE - CLINICAL COURSE
Patient is a 48 y/o F with PMHx sig for gastric sleeve 2022, hypothyroidism who presents to the ED with syncopal episode. Pt had 1 syncopal episodes while sitting 1 month ago, but states she felt like she was going to pass out. Today had similar episode where she was sitting at desk and "slumped over" without any preceding symptoms. Denied chest pain, sob, palpitations, dizziness,  tremors, urinary incontinence. Witnessed by co-worker. No fall or head injury. After first syncopal episode pt followed up with neurology. pt states she had MRIs done which she assumes were negative. Was supposed to have EEG done today. Has not followed with cardiologist. Eating/drinking well. No f/c, uri sx, urinary or bowel changes.  ED course: Labs unremarkable. Plan for tele monitoring and echo in CDU. Patient is a 48 y/o F with PMHx sig for gastric sleeve 2022, hypothyroidism who presents to the ED with syncopal episode. Pt had 1 syncopal episodes while sitting 1 month ago, but states she felt like she was going to pass out. Today had similar episode where she was sitting at desk and "slumped over" without any preceding symptoms. Denied chest pain, sob, palpitations, dizziness,  tremors, urinary incontinence. Witnessed by co-worker. No fall or head injury. After first syncopal episode pt followed up with neurology. pt states she had MRIs done which she assumes were negative. Was supposed to have EEG done today. Has not followed with cardiologist. Eating/drinking well. No f/c, uri sx, urinary or bowel changes.  ED course: Labs unremarkable. Plan for tele monitoring and echo in CDU.  In CDU pt did had stable vitals, no recurrent episodes of symptoms. VSS. pt was evaluated by neuro recommends CT/CTA head and neck. evaluated by cardiology for syncopal episodes as well. spoke with cardiologist Dr. Mak recommends admission for further cardiologic syncope workup. echo without emergent pathology. no events on tele. pt agreeable to admission, stable. seen by Dr German in CDU. stable prior to admission

## 2022-11-05 NOTE — ED CDU PROVIDER SUBSEQUENT DAY NOTE - NS ED ROS FT
Constitutional: No fever or chills +syncope   Eyes: No visual changes, eye pain   CV: No chest pain or lower extremity edema  Resp: No SOB no cough  GI: No abd pain. No nausea or vomiting.   : No dysuria, hematuria.   MSK: No musculoskeletal pain  Skin: No rash  Psych: No complaints   Neuro: No headache. No numbness or tingling. No weakness.  Endo: No known diabetes

## 2022-11-05 NOTE — ED CDU PROVIDER SUBSEQUENT DAY NOTE - HISTORY
No interval changes since initial CDU provider note. Pt feels well without complaint. NAD VSS. no events on tele. Pending Echo in the setting of syncope.  - KRZYSZTOF Friend

## 2022-11-05 NOTE — ED CDU PROVIDER SUBSEQUENT DAY NOTE - PROGRESS NOTE DETAILS
CDU PROGRESS NOTE KRZYSZTOF CONNELL: pt feels well, NAD. VSS. pt describes yesterdays episode as sudden onset without preceding symptoms, previous episode were preceded. cardiology and neuro consulted given pt follows with both services. TTE performed, read pending. no events on tele. Will continue to monitor CDU PROGRESS NOTE KRZYSZTOF CONNELL: pt evaluated by neuro recommends CT/CTA head and neck. spoke with cardiologist Dr. Mak recommends admission for further cardiologic syncope workup. echo without emergent pathology. no events on tele. pt agreeable to admission, stable. seen by Dr German in CDU. stable prior to admission

## 2022-11-05 NOTE — ED CDU PROVIDER SUBSEQUENT DAY NOTE - ATTENDING APP SHARED VISIT CONTRIBUTION OF CARE
Attending MD German:   I personally have seen and examined this patient.  Physician assistant note reviewed and agree on plan of care and except where noted.  See below for details.     Seen in CDU [facility] 47    47F with PMH/PSH including gastric sleeve (2/2022, Dr. Ding), hypothyroidism sent to the CDU after presenting to the ED with syncope.  Patient had a syncopal episode about a month ago but at that time had preceding symptoms.  Yesterday was siting, without recent change in position, and had sudden LOC, witnessed.  In Emergency Department, labs noted.  Denies chest pain, shortness of breath, abdominal pain, nausea, vomiting, diarrhea, urinary complaints.  Reports is on prophylactic Macrobid for frequent recurrent UTIs.    Exam:   General: NAD  HENT: head NCAT, airway patent  Eyes: PERRL, EOMI  Lungs: lungs CTAB with good inspiratory effort, no wheezing, no rhonchi, no rales  Cardiac: +S1S2, no obvious m/r/g  GI: abdomen soft with +BS, NT, ND  MSK: ranging neck and extremities freely   Neuro: moving all extremities spontaneously, sensory grossly intact, no gross neuro deficits  Psych: normal mood and affect     A/P: 47F with syncope, pending Echo results, cards consult, neuro consult, will await

## 2022-11-05 NOTE — ED CDU PROVIDER DISPOSITION NOTE - NSFOLLOWUPINSTRUCTIONS_ED_ALL_ED_FT
Hydrate.     We recommend you follow up with your primary care provider/cardiology within the next 2-3 days, please bring all of your results with you.     Please return to the Emergency Department with new, worsening, or concerning symptoms, such as:  -Shortness of breath or trouble breathing  -Pressure, pain, tightness in chest  -Facial drooping, arm weakness, or speech difficulty   -Head injury or loss of consciousness   -Nonstop bleeding or an open wound     *More detailed information regarding your visit and discharge can be found by reviewing this packet

## 2022-11-05 NOTE — CONSULT NOTE ADULT - SUBJECTIVE AND OBJECTIVE BOX
DATE OF SERVICE: 11-05-22 @ 11:28    CHIEF COMPLAINT:Patient is a 47y old  Female who presents with a chief complaint of     HISTORY OF PRESENT ILLNESS: Patient is a 48 y/o F with PMHx sig for gastric sleeve Feb, 2022, hypothyroidism who presents to the ED with syncopal episode. Pt had 1 syncopal episodes while sitting 1 month ago, but states she felt like she was going to pass out. Today had similar episode where she was sitting at desk and "slumped over" without any preceding symptoms. Denied chest pain, sob, palpitations, dizziness,  tremors, urinary incontinence. Witnessed by co-worker. No fall or head injury. After first syncopal episode pt followed up with neurology. pt states she had MRIs done which she assumes were negative. Was supposed to have EEG done. Has not followed with cardiologist. Eating/drinking well. No f/c, uri sx, urinary or bowel changes.    PAST MEDICAL & SURGICAL HISTORY:  Adult hypothyroidism      Adult-onset obesity  BMI 37.6      Gastric band malfunction      Chronic cystitis  on macrobid daily      History of papilledema  Right eye; Followed by an opthomologist - No meds      History of cholecystectomy  possibly 2005      Hx of laparoscopic gastric banding  around 2008      S/P abdominoplasty  2012      History of suburethral sling procedure  2010              MEDICATIONS:    nitrofurantoin monohydrate/macrocrystals (MACROBID) 100 milliGRAM(s) Oral <User Schedule>          levothyroxine 150 MICROGram(s) Oral daily        FAMILY HISTORY:  Family history of heart disease (Father)        Non-contributory    SOCIAL HISTORY:    [- ] Tobacco  [- ] Drugs  [- ] Alcohol    Allergies    Bactrim (Pruritus)  sulfADIAZINE (Unknown)    Intolerances    	    REVIEW OF SYSTEMS:  CONSTITUTIONAL: No fever  EYES: No eye pain, visual disturbances, or discharge  ENMT:  No difficulty hearing, tinnitus  NECK: No pain or stiffness  RESPIRATORY: No cough, wheezing,  CARDIOVASCULAR: No chest pain, palpitations, passing out, dizziness, or leg swelling  GASTROINTESTINAL:  No nausea, vomiting, diarrhea or constipation. No melena.  GENITOURINARY: No dysuria, hematuria  NEUROLOGICAL: No stroke like symptoms  SKIN: No burning or lesions   ENDOCRINE: No heat or cold intolerance  MUSCULOSKELETAL: No joint pain or swelling  PSYCHIATRIC: No  anxiety, mood swings  HEME/LYMPH: No bleeding gums  ALLERGY AND IMMUNOLOGIC: No hives or eczema	    All other ROS negative    PHYSICAL EXAM:  T(C): 36.3 (11-05-22 @ 08:02), Max: 36.9 (11-04-22 @ 18:47)  HR: 60 (11-05-22 @ 08:02) (60 - 98)  BP: 117/75 (11-05-22 @ 04:31) (108/73 - 138/88)  RR: 18 (11-05-22 @ 08:02) (16 - 18)  SpO2: 98% (11-05-22 @ 08:02) (98% - 100%)  Wt(kg): --  I&O's Summary      Appearance: Normal	  HEENT:   Normal oral mucosa, EOMI	  Cardiovascular:  S1 S2, No JVD,    Respiratory: Lungs clear to auscultation	  Psychiatry: Alert  Gastrointestinal:  Soft, Non-tender, + BS	  Skin: No rashes   Neurologic: Non-focal  Extremities:  No edema  Vascular: Peripheral pulses palpable    	    	  	  CARDIAC MARKERS:  Labs personally reviewed by me                                  13.6   11.09 )-----------( 282      ( 04 Nov 2022 15:58 )             42.4     11-05    142  |  106  |  15  ----------------------------<  91  3.5   |  26  |  0.63    Ca    8.8      05 Nov 2022 06:45  Phos  3.8     11-04  Mg     2.1     11-04    TPro  8.6<H>  /  Alb  4.5  /  TBili  0.6  /  DBili  x   /  AST  37  /  ALT  34  /  AlkPhos  104  11-04          EKG: Personally reviewed by me -  NSR  Radiology: Personally reviewed by me -     < from: Xray Chest 2 Views PA/Lat (11.04.22 @ 14:51) >  The heart is normal in size..  The lungs are clear.  No pleural effusion or pneumothorax.    IMPRESSION:  Clear lungs.    < end of copied text >  < from: Transthoracic Echocardiogram (07.07.21 @ 13:25) >  EF (Tipton Rule): 61 %Doppler Peak Velocity (m/sec):  AoV=1.2  ------------------------------------------------------------------------  Observations:  Mitral Valve: Normal mitral valve.  Aortic Valve/Aorta: Normal trileaflet aortic valve. Peak  transaortic valve gradient equals 6 mm Hg. Peak left  ventricular outflow tract gradient equals 2 mm Hg.  Aortic Root: 2.7 cm.  Left Atrium: Normal left atrium.  LA volume index = 25  cc/m2.  Left Ventricle: Endocardium not well visualized; grossly  normal left ventricular systolic function. Normal left  ventricular internal dimensions and wall thicknesses.  Right Heart: Normal right atrium. Normal right ventricular  size and systolic function. Normal tricuspid valve. Minimal  tricuspid regurgitation. Normal pulmonic valve. Minimal  pulmonic regurgitation.  Pericardium/Pleura: Normal pericardium with no pericardial  effusion.  Hemodynamic: Estimated right atrial pressure is 8 mm Hg.  ------------------------------------------------------------------------  Conclusions:  1. Normal trileaflet aortic valve.  2. Normal left atrium.  LA volume index = 25 cc/m2.  3.Normal left ventricular internal dimensions and wall  thicknesses.  4. Endocardium not well visualized; grossly normal left  ventricular systolic function.  5. Normal right ventricular size and systolic function.  *** No previous Echo exam.    < end of copied text >  < from: Cardiac Treadmill Stress Test (Non Imaging) (Cardiac Treadmill Stress Test (Non Imaging) .) (07.07.21 @ 15:59) >  STRESS TEST IMPRESSIONS:  Exercise capacity: 7 METS, Poor for age and gender.  Chest Pain: No chest pain with exercise.  Symptom: Fatigue.  HR Response: Appropriate.  BP Response: Appropriate.  Heart Rhythm: Sinus Rhythm - 83 BPM.  Baseline ECG: Nonspecific ST-T wave abnormality.  ECG Changes: ST Depression: Approximately 0.5 mm  horizontal in leads II, III, aVF, and V3-V6 started at  3:00 min of exercise at HR of 107 bpm.  At 5:00 min of  recovery at 110 bpm, the ST depressions in these leads  became approximately 0.5 mm downsloping and  persisted  11:19 min into recovery.  Arrhythmia: Rare VPDs occurred during stress.  *** No previous Nuclear/Stress exam.        Assessment /Plan:     Patient is a 48 y/o F with PMHx sig for gastric sleeve Feb, 2022, hypothyroidism who presents to the ED with syncopal episode. Pt had 1 syncopal episodes while sitting 1 month ago, but states she felt like she was going to pass out. Today had similar episode where she was sitting at desk and "slumped over" without any preceding symptoms. Denied chest pain, sob, palpitations, dizziness,  tremors, urinary incontinence. Witnessed by co-worker. No fall or head injury. After first syncopal episode pt followed up with neurology. pt states she had MRIs done which she assumes were negative. Was supposed to have EEG done. Has not followed with cardiologist. Eating/drinking well. No f/c, uri sx, urinary or bowel changes.    1. Syncope  - ECG NSR with no ischemia noted  - Trop 9  - Mild leukocytosis  - CXR unremarkable  - Electrolytes wnl  - Prior TTE shows E 61%, unremarkable  - Treadmill ST from 2021 terminated early d/t fatigue, no CP noted  - Being followed by OP Neurologist, MR done and reportedly unremarkable  - Planned for EEG as outpatient  - Neurology consult appreciated: CTA H/N pending  - Repeat TTE  - Monitor on tele  - Check orthos  - Prior syncopal episode had precipitating symptoms such as dizziness, nausea. This episode was not preceded by any precipitating symptoms. If above workup is negative, will consult EP.  - R/o Seizure vs. Acute infarct vs. orthostatic hypotension (although less likely since patient was sitting down during this episode) vs. cardiac     2. Hypothyroidism  - Check TSH    3. hx of Gastric Bypass  - Reports frequent GERD  - Reports adequate PO intake        Differential diagnosis and plan of care discussed with patient after the evaluation. Counseling on diet, nutritional counseling, weight management, exercise and medication compliance was done.   Advanced care planning/advanced directives discussed with patient/family. DNR status including forceful chest compressions to attempt to restart the heart, ventilator support/artificial breathing, electric shock, artificial nutrition, health care proxy, Molst form all discussed with pt. Pt wishes to consider. More than fifteen minutes spent on discussing advanced directives.     Nena Rodrigues [facility]  Felipe Mak, DO [facility]  Cardiovascular Medicine  [address], [address]  Available for call or text via [employer]  Office [phone]   DATE OF SERVICE: 11-05-22 @ 11:28    CHIEF COMPLAINT:Patient is a 47y old  Female who presents with a chief complaint of syncope    HISTORY OF PRESENT ILLNESS: Patient is a 48 y/o F with PMHx sig for gastric sleeve Feb, 2022, hypothyroidism who presents to the ED with syncopal episode. Pt had 1 syncopal episodes while sitting 1 month ago, but states she felt like she was going to pass out. Today had similar episode where she was sitting at desk and "slumped over" without any preceding symptoms. Denied chest pain, sob, palpitations, dizziness,  tremors, urinary incontinence. Witnessed by co-worker. No fall or head injury. After first syncopal episode pt followed up with neurology. pt states she had MRIs done which she assumes were negative. Was supposed to have EEG done. Has not followed with cardiologist. Eating/drinking well. No f/c, uri sx, urinary or bowel changes.    PAST MEDICAL & SURGICAL HISTORY:  Adult hypothyroidism      Adult-onset obesity  BMI 37.6      Gastric band malfunction      Chronic cystitis  on macrobid daily      History of papilledema  Right eye; Followed by an opthomologist - No meds      History of cholecystectomy  possibly 2005      Hx of laparoscopic gastric banding  around 2008      S/P abdominoplasty  2012      History of suburethral sling procedure  2010              MEDICATIONS:    nitrofurantoin monohydrate/macrocrystals (MACROBID) 100 milliGRAM(s) Oral <User Schedule>          levothyroxine 150 MICROGram(s) Oral daily        FAMILY HISTORY:  Family history of heart disease (Father)        Non-contributory    SOCIAL HISTORY:    [- ] Tobacco  [- ] Drugs  [- ] Alcohol    Allergies    Bactrim (Pruritus)  sulfADIAZINE (Unknown)    Intolerances    	    REVIEW OF SYSTEMS:  CONSTITUTIONAL: No fever  EYES: No eye pain, visual disturbances, or discharge  ENMT:  No difficulty hearing, tinnitus  NECK: No pain or stiffness  RESPIRATORY: No cough, wheezing,  CARDIOVASCULAR: No chest pain, palpitations, passing out, dizziness, or leg swelling  GASTROINTESTINAL:  No nausea, vomiting, diarrhea or constipation. No melena.  GENITOURINARY: No dysuria, hematuria  NEUROLOGICAL: No stroke like symptoms  SKIN: No burning or lesions   ENDOCRINE: No heat or cold intolerance  MUSCULOSKELETAL: No joint pain or swelling  PSYCHIATRIC: No  anxiety, mood swings  HEME/LYMPH: No bleeding gums  ALLERGY AND IMMUNOLOGIC: No hives or eczema	    All other ROS negative    PHYSICAL EXAM:  T(C): 36.3 (11-05-22 @ 08:02), Max: 36.9 (11-04-22 @ 18:47)  HR: 60 (11-05-22 @ 08:02) (60 - 98)  BP: 117/75 (11-05-22 @ 04:31) (108/73 - 138/88)  RR: 18 (11-05-22 @ 08:02) (16 - 18)  SpO2: 98% (11-05-22 @ 08:02) (98% - 100%)  Wt(kg): --  I&O's Summary      Appearance: Normal	  HEENT:   Normal oral mucosa, EOMI	  Cardiovascular:  S1 S2, No JVD,    Respiratory: Lungs clear to auscultation	  Psychiatry: Alert  Gastrointestinal:  Soft, Non-tender, + BS	  Skin: No rashes   Neurologic: Non-focal  Extremities:  No edema  Vascular: Peripheral pulses palpable    	    	  	  CARDIAC MARKERS:  Labs personally reviewed by me                                  13.6   11.09 )-----------( 282      ( 04 Nov 2022 15:58 )             42.4     11-05    142  |  106  |  15  ----------------------------<  91  3.5   |  26  |  0.63    Ca    8.8      05 Nov 2022 06:45  Phos  3.8     11-04  Mg     2.1     11-04    TPro  8.6<H>  /  Alb  4.5  /  TBili  0.6  /  DBili  x   /  AST  37  /  ALT  34  /  AlkPhos  104  11-04          EKG: Personally reviewed by me -  NSR  Radiology: Personally reviewed by me -     < from: Xray Chest 2 Views PA/Lat (11.04.22 @ 14:51) >  The heart is normal in size..  The lungs are clear.  No pleural effusion or pneumothorax.    IMPRESSION:  Clear lungs.    < end of copied text >  < from: Transthoracic Echocardiogram (07.07.21 @ 13:25) >  EF (Tipton Rule): 61 %Doppler Peak Velocity (m/sec):  AoV=1.2  ------------------------------------------------------------------------  Observations:  Mitral Valve: Normal mitral valve.  Aortic Valve/Aorta: Normal trileaflet aortic valve. Peak  transaortic valve gradient equals 6 mm Hg. Peak left  ventricular outflow tract gradient equals 2 mm Hg.  Aortic Root: 2.7 cm.  Left Atrium: Normal left atrium.  LA volume index = 25  cc/m2.  Left Ventricle: Endocardium not well visualized; grossly  normal left ventricular systolic function. Normal left  ventricular internal dimensions and wall thicknesses.  Right Heart: Normal right atrium. Normal right ventricular  size and systolic function. Normal tricuspid valve. Minimal  tricuspid regurgitation. Normal pulmonic valve. Minimal  pulmonic regurgitation.  Pericardium/Pleura: Normal pericardium with no pericardial  effusion.  Hemodynamic: Estimated right atrial pressure is 8 mm Hg.  ------------------------------------------------------------------------  Conclusions:  1. Normal trileaflet aortic valve.  2. Normal left atrium.  LA volume index = 25 cc/m2.  3.Normal left ventricular internal dimensions and wall  thicknesses.  4. Endocardium not well visualized; grossly normal left  ventricular systolic function.  5. Normal right ventricular size and systolic function.  *** No previous Echo exam.    < end of copied text >  < from: Cardiac Treadmill Stress Test (Non Imaging) (Cardiac Treadmill Stress Test (Non Imaging) .) (07.07.21 @ 15:59) >  STRESS TEST IMPRESSIONS:  Exercise capacity: 7 METS, Poor for age and gender.  Chest Pain: No chest pain with exercise.  Symptom: Fatigue.  HR Response: Appropriate.  BP Response: Appropriate.  Heart Rhythm: Sinus Rhythm - 83 BPM.  Baseline ECG: Nonspecific ST-T wave abnormality.  ECG Changes: ST Depression: Approximately 0.5 mm  horizontal in leads II, III, aVF, and V3-V6 started at  3:00 min of exercise at HR of 107 bpm.  At 5:00 min of  recovery at 110 bpm, the ST depressions in these leads  became approximately 0.5 mm downsloping and  persisted  11:19 min into recovery.  Arrhythmia: Rare VPDs occurred during stress.  *** No previous Nuclear/Stress exam.        Assessment /Plan:     Patient is a 48 y/o F with PMHx sig for gastric sleeve Feb, 2022, hypothyroidism who presents to the ED with syncopal episode. Pt had 1 syncopal episodes while sitting 1 month ago, but states she felt like she was going to pass out. Today had similar episode where she was sitting at desk and "slumped over" without any preceding symptoms. Denied chest pain, sob, palpitations, dizziness,  tremors, urinary incontinence. Witnessed by co-worker. No fall or head injury. After first syncopal episode pt followed up with neurology. pt states she had MRIs done which she assumes were negative. Was supposed to have EEG done. Has not followed with cardiologist. Eating/drinking well. No f/c, uri sx, urinary or bowel changes.    1. Syncope with no prodrome  - given no prodrome concerning for arrhythmogenic   - ? 2/2 UTI  - ECG NSR with no ischemia noted  - Trop 9  - Mild leukocytosis  - CXR unremarkable  - Electrolytes wnl  - Prior TTE shows E 61%, unremarkable  - Treadmill ST from 2021 terminated early d/t fatigue, no CP noted  - Being followed by OP Neurologist, MR done and reportedly unremarkable  - Neurology consult appreciated: CTA H/N pending  - Repeat TTE  - Monitor on tele  - Check orthos  - Prior syncopal episode 2 months ago had precipitating symptoms such as dizziness, nausea. This episode was not preceded by any precipitating symptoms. If above workup is negative, will consult EP.  - R/o Seizure vs. Acute infarct vs. orthostatic hypotension (although less likely since patient was sitting down during this episode) vs. cardiac     2. Hypothyroidism  - Check TSH    3. hx of Gastric Bypass  - Reports frequent GERD  - Reports adequate PO intake        Differential diagnosis and plan of care discussed with patient after the evaluation. Counseling on diet, nutritional counseling, weight management, exercise and medication compliance was done.   Advanced care planning/advanced directives discussed with patient/family. DNR status including forceful chest compressions to attempt to restart the heart, ventilator support/artificial breathing, electric shock, artificial nutrition, health care proxy, Molst form all discussed with pt. Pt wishes to consider. More than fifteen minutes spent on discussing advanced directives.     Nena Rodrigues [facility]  Felipe Mak DO [facility]  Cardiovascular Medicine  [address] [place] [address] Dr, [address]  Available for call or text via [employer]  Office [phone]

## 2022-11-05 NOTE — H&P ADULT - NSHPPHYSICALEXAM_GEN_ALL_CORE
T(C): 36.4 (11-05-22 @ 12:12), Max: 36.9 (11-04-22 @ 18:47)  HR: 73 (11-05-22 @ 12:12) (60 - 98)  BP: 111/75 (11-05-22 @ 12:12) (108/73 - 138/88)  RR: 18 (11-05-22 @ 12:12) (16 - 18)  SpO2: 98% (11-05-22 @ 12:12) (98% - 100%)    PHYSICAL EXAM:  GENERAL: NAD, well-developed  HEAD:  Atraumatic, Normocephalic  EYES: EOMI, PERRLA, conjunctiva and sclera clear  NECK: Supple, No JVD  CHEST/LUNG: Clear to auscultation bilaterally; No wheeze  HEART: Regular rate and rhythm; No murmurs, rubs, or gallops  ABDOMEN: Soft, Nontender, Nondistended; Bowel sounds present  EXTREMITIES:  2+ Peripheral Pulses, No clubbing, cyanosis, or edema  PSYCH: AAOx3  NEUROLOGY: non-focal  SKIN: No rashes or lesions

## 2022-11-05 NOTE — H&P ADULT - HISTORY OF PRESENT ILLNESS
46 y/o F with PMHx sig for gastric sleeve Feb, 2022, hypothyroidism who presents to the ED with syncopal episode. Pt had 1 syncopal episodes while sitting 1 month ago, but states she felt like she was going to pass out. on 11/4 had a  similar episode where she was sitting at desk and "slumped over" without any preceding symptoms. Denied chest pain, sob, palpitations, dizziness,  tremors, urinary incontinence. Witnessed by co-worker. No fall or head injury. After first syncopal episode pt followed up with neurology. pt states she has had MRIs done which she assumes were negative. Was supposed to have EEG done. Has not followed with cardiologist. Eating/drinking well. No f/c, uri sx, urinary or bowel changes.
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
## 2022-11-05 NOTE — ED CDU PROVIDER SUBSEQUENT DAY NOTE - PHYSICAL EXAMINATION
General: Alert and Orientated x 3. No apparent distress.  	Head: Normocephalic and atraumatic.  	Eyes:  EOMI  	Cardiac: Normal S1 and S2 w/ RRR. No murmurs appreciated.   	Pulmonary: CTAB, no increased work of breathing.   	Abdominal: Soft, non-tender.  Neurological: Clear speech, follow commands.  	Musculoskeletal: Moving all extremities without difficulty    	Skin: Color appropriate for race. Intact, warm, and well-perfused.  Psychiatric: Appropriate mood and affect.
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
## 2022-11-06 LAB — T4 AB SER-ACNC: 8.7 UG/DL — SIGNIFICANT CHANGE UP (ref 4.6–12)

## 2022-11-06 RX ORDER — CEFTRIAXONE 500 MG/1
1000 INJECTION, POWDER, FOR SOLUTION INTRAMUSCULAR; INTRAVENOUS EVERY 24 HOURS
Refills: 0 | Status: DISCONTINUED | OUTPATIENT
Start: 2022-11-06 | End: 2022-11-06

## 2022-11-06 RX ORDER — NITROFURANTOIN MACROCRYSTAL 50 MG
100 CAPSULE ORAL
Refills: 0 | Status: DISCONTINUED | OUTPATIENT
Start: 2022-11-06 | End: 2022-11-07

## 2022-11-06 RX ORDER — PANTOPRAZOLE SODIUM 20 MG/1
40 TABLET, DELAYED RELEASE ORAL
Refills: 0 | Status: DISCONTINUED | OUTPATIENT
Start: 2022-11-06 | End: 2022-11-07

## 2022-11-06 RX ADMIN — Medication 100 MILLIGRAM(S): at 17:06

## 2022-11-06 RX ADMIN — Medication 150 MICROGRAM(S): at 05:13

## 2022-11-06 NOTE — PATIENT PROFILE ADULT - FALL HARM RISK - HARM RISK INTERVENTIONS

## 2022-11-06 NOTE — ED ADULT NURSE REASSESSMENT NOTE - NS ED NURSE REASSESS COMMENT FT1
[id number] Pt received bed assignment and pt want to clarify antibiotics order with her PMD spoke to [place] cota pt has a bed on 4 [facility] attempting to give report Stan
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
## 2022-11-06 NOTE — PATIENT PROFILE ADULT - FALL HARM RISK - FACTORS NURSING JUDGEMENT
[facility] attendin}.    Maria Guadalupe Greene DO  Family Medicine  PGY3   Yes

## 2022-11-06 NOTE — PROGRESS NOTE ADULT - SUBJECTIVE AND OBJECTIVE BOX
Name of Patient : JOAN SINGER  MRN: [id number]  Date of visit: 11-06-22 @ 09:41      Subjective: Patient seen and examined. No new events except as noted.     REVIEW OF SYSTEMS:    CONSTITUTIONAL: No weakness, fevers or chills  EYES/ENT: No visual changes;  No vertigo or throat pain   NECK: No pain or stiffness  RESPIRATORY: No cough, wheezing, hemoptysis; No shortness of breath  CARDIOVASCULAR: No chest pain or palpitations  GASTROINTESTINAL: No abdominal or epigastric pain. No nausea, vomiting, or hematemesis; No diarrhea or constipation. No melena or hematochezia.  GENITOURINARY: No dysuria, frequency or hematuria  NEUROLOGICAL: No numbness or weakness  SKIN: No itching, burning, rashes, or lesions   All other review of systems is negative unless indicated above.    MEDICATIONS:  MEDICATIONS  (STANDING):  levothyroxine 150 MICROGram(s) Oral daily      PHYSICAL EXAM:  T(C): 36.8 (11-06-22 @ 08:07), Max: 36.8 (11-05-22 @ 15:21)  HR: 70 (11-06-22 @ 08:07) (60 - 86)  BP: 120/80 (11-06-22 @ 08:07) (102/69 - 131/91)  RR: 18 (11-06-22 @ 08:07) (16 - 18)  SpO2: 98% (11-06-22 @ 08:07) (97% - 99%)  Wt(kg): --  I&O's Summary        Appearance: Normal	  HEENT:  PERRLA   Lymphatic: No lymphadenopathy   Cardiovascular: Normal S1 S2, no JVD  Respiratory: normal effort , clear  Gastrointestinal:  Soft, Non-tender  Skin: No rashes,  warm to touch  Psychiatry:  Mood & affect appropriate  Musculuskeletal: No edema             Name of Patient : JOAN SINGER  MRN: [id number]  Date of visit: 11-06-22 @ 09:41      Subjective: Patient seen and examined. No new events except as noted.   Patient seen lying down in bed. Denies vision changes. Reports that episode of syncope that occurred on 11/04 was the second instance.     REVIEW OF SYSTEMS:    CONSTITUTIONAL: Generalized weakness   EYES/ENT: No visual changes;  No vertigo or throat pain   NECK: No pain or stiffness  RESPIRATORY: No cough, wheezing, hemoptysis; No shortness of breath  CARDIOVASCULAR: No chest pain or palpitations  GASTROINTESTINAL: No abdominal or epigastric pain. No nausea, vomiting, or hematemesis; No diarrhea or constipation. No melena or hematochezia.  GENITOURINARY: No dysuria, frequency or hematuria  NEUROLOGICAL: + H/O Syncope and dizziness   SKIN: No itching, burning, rashes, or lesions   All other review of systems is negative unless indicated above.    MEDICATIONS:  MEDICATIONS  (STANDING):  levothyroxine 150 MICROGram(s) Oral daily      PHYSICAL EXAM:  T(C): 36.8 (11-06-22 @ 08:07), Max: 36.8 (11-05-22 @ 15:21)  HR: 70 (11-06-22 @ 08:07) (60 - 86)  BP: 120/80 (11-06-22 @ 08:07) (102/69 - 131/91)  RR: 18 (11-06-22 @ 08:07) (16 - 18)  SpO2: 98% (11-06-22 @ 08:07) (97% - 99%)  Wt(kg): --  I&O's Summary        Appearance: Normal	  HEENT:  Eyes are open   Lymphatic: No lymphadenopathy   Cardiovascular: Normal S1 S2, no JVD  Respiratory: normal effort , clear  Gastrointestinal:  Soft, Non-tender  Skin: No rashes,  warm to touch  Psychiatry:  Mood & affect appropriate  Musculoskeletal: No edema                                13.6   11.09 )-----------( 282      ( 04 Nov 2022 15:58 )             42.4               11-05    142  |  106  |  15  ----------------------------<  91  3.5   |  26  |  0.63    Ca    8.8      05 Nov 2022 06:45  Phos  3.8     11-04  Mg     2.1     11-04    TPro  8.6<H>  /  Alb  4.5  /  TBili  0.6  /  DBili  x   /  AST  37  /  ALT  34  /  AlkPhos  104  11-04                           < from: CT Head No Cont (11.05.22 @ 14:46) >  IMPRESSION:    Head CT:    No acute intracranial abnormality is noted.    If the patient has new and persistent unexplained symptoms, consider   short interval follow-up head CT or brain MRI follow-up.    CTA NECK:    No significant carotid or vertebral artery stenosis.    CTA HEAD:    No evidence for focal stenosis, major vessel occlusion, or aneurysm about   the [place].    < end of copied text >      < from: TTE with Doppler (w/Cont) (11.05.22 @ 07:11) >    Patient name: JOAN SINGER  YOB: 1975   Age: 47 (F)   MR#: [id number]  Study Date: 11/5/2022  Location: O/PSonographer: Anum Peters RDCS  Study quality: Technically fair  Referring Physician: Panchito Lizarraga MD  Blood Pressure: 132/84 mmHg  Height: 157 cm  Weight: 79 kg  BSA: 1.8 m2  ------------------------------------------------------------------------  PROCEDURE: Transthoracic echocardiogram with 2-D, M-Mode  and complete spectral and color flow Doppler. Verbal  consent was obtained for injection of  Ultrasonic Enhancing  Agent following a discussion of risks and benefits.  Following intravenous injection of Ultrasonic Enhancing  Agent, harmonic imaging was performed.  INDICATION: Syncope and collapse (R55)  ------------------------------------------------------------------------  Dimensions:    Normal Values:  LA:     3.3    2.0 - 4.0 cm  Ao:     3.0    2.0 - 3.8 cm  SEPTUM: 1.0    0.6 - 1.2 cm  PWT:    0.8    0.6 - 1.1 cm  LVIDd:  4.3    3.0 - 5.6 cm  LVIDs:  2.3   1.8 - 4.0 cm  Derived variables:  LVMI: 68 g/m2  RWT: 0.37  Fractional short: 47 %  EF (Modified Tipton Rule): 65 %Doppler Peak Velocity  (m/sec): AoV=1.5  ------------------------------------------------------------------------  Observations:  Mitral Valve: Normal mitral valve.  Aortic Valve/Aorta: Normal trileaflet aortic valve. Peak  transaortic valve gradient equals 9 mm Hg. Peak left  ventricular outflow tract gradient equals 4 mm Hg, mean  gradient is equal to 2 mm Hg, LVOT velocity time integral  equals 23 cm.  Aortic Root: 3 cm.  Ascending Aorta: 2.8 cm.  LVOT diameter: 2 cm.  Left Atrium: Normal left atrium.  LA volume index = 21  cc/m2.  Left Ventricle: Normal left ventricular systolic function.  No segmental wall motion abnormalities. Endocardial  visualization enhanced with intravenous injection of  Ultrasonic Enhancing Agent (Lumason). Normal left  ventricular internal dimensions and wall thicknesses.  Normal diastolic function  Right Heart: Normal right atrium. Normal right ventricular  size and function. Normal tricuspid valve. Normal pulmonic  valve. Mild pulmonic regurgitation.  Pericardium/Pleura: Normal pericardium with trace  pericardial effusion.  Hemodynamic: Estimated right atrial pressure is 8 mm Hg.  ------------------------------------------------------------------------  Conclusions:  1. Normal left ventricular internal dimensions and wall  thicknesses.  2. Normal left ventricular systolic function. No segmental  wall motion abnormalities. Endocardial visualization  enhanced with intravenous injection of Ultrasonic Enhancing  Agent (Lumason).  3. Normal diastolic function  4. Normal right ventricular size and function.  *** Compared with echocardiogram of 7/7/2021, no  significant changes noted.  ------------------------------------------------------------------------  Confirmed on  11/5/2022 - 11:32:17 by SWAPNA Reis  ------------------------------------------------------------------------    < end of copied text >

## 2022-11-06 NOTE — ED ADULT NURSE REASSESSMENT NOTE - NS ED NURSE REASSESS COMMENT FT1
[id number] Pt ordered for Rocephin Iv and pt wants to know who ordered the  medication before taking and why called [id number] [place] covering  Np will come see the pt M[place]

## 2022-11-06 NOTE — PROGRESS NOTE ADULT - SUBJECTIVE AND OBJECTIVE BOX
Patient is a 47y old  Female who presents with a chief complaint of syncope (06 Nov 2022 21:46)      SUBJECTIVE / OVERNIGHT EVENTS: ptn requested i follow her for medical consult. EPS Dr. Forrest called for eval. no events on tele. ptn states she is Dr. Hickman's ptn but hasnt seen her close to 1.5 years. ptn is NOT orthostatic. ptn is perimenopausal but did not have any prodromal symptoms of hot flashes or dizziness prior to LOC    MEDICATIONS  (STANDING):  levothyroxine 150 MICROGram(s) Oral daily  nitrofurantoin monohydrate/macrocrystals (MACROBID) 100 milliGRAM(s) Oral <User Schedule>  pantoprazole    Tablet 40 milliGRAM(s) Oral before breakfast    MEDICATIONS  (PRN):      Vital Signs Last 24 Hrs  T(F): 97.9 (11-06-22 @ 20:17), Max: 98.6 (11-06-22 @ 15:45)  HR: 71 (11-06-22 @ 20:17) (60 - 80)  BP: 130/86 (11-06-22 @ 20:17) (102/69 - 130/86)  RR: 15 (11-06-22 @ 20:17) (15 - 18)  SpO2: 99% (11-06-22 @ 20:17) (97% - 99%)  Telemetry:   CAPILLARY BLOOD GLUCOSE        I&O's Summary    06 Nov 2022 07:01  -  06 Nov 2022 22:37  --------------------------------------------------------  IN: 120 mL / OUT: 0 mL / NET: 120 mL        PHYSICAL EXAM:  GENERAL: NAD, well-developed  HEAD:  Atraumatic, Normocephalic  EYES: EOMI, PERRLA, conjunctiva and sclera clear  NECK: Supple, No JVD  CHEST/LUNG: Clear to auscultation bilaterally; No wheeze  HEART: Regular rate and rhythm; No murmurs, rubs, or gallops  ABDOMEN: Soft, Nontender, Nondistended; Bowel sounds present  EXTREMITIES:  2+ Peripheral Pulses, No clubbing, cyanosis, or edema  PSYCH: AAOx3  NEUROLOGY: non-focal  SKIN: No rashes or lesions    LABS:    11-05    142  |  106  |  15  ----------------------------<  91  3.5   |  26  |  0.63    Ca    8.8      05 Nov 2022 06:45                RADIOLOGY & ADDITIONAL TESTS:    Imaging Personally Reviewed:    Consultant(s) Notes Reviewed:      Care Discussed with Consultants/Other Providers:

## 2022-11-06 NOTE — PATIENT PROFILE ADULT - LIVING ENVIRONMENT
Call patient with lab result-let her know the sugars are up, s she still taking medications as prescribed? I will discuss at visit on the 15th  Be sure she keeps that appointment  no

## 2022-11-06 NOTE — PROGRESS NOTE ADULT - SUBJECTIVE AND OBJECTIVE BOX
DATE OF SERVICE: 11-06-22 @ 21:46    Patient is a 47y old  Female who presents with a chief complaint of syncope (06 Nov 2022 09:40)      INTERVAL HISTORY: feels ok    REVIEW OF SYSTEMS:  CONSTITUTIONAL: No weakness  EYES/ENT: No visual changes;  No throat pain   NECK: No pain or stiffness  RESPIRATORY: No cough, wheezing; No shortness of breath  CARDIOVASCULAR: No chest pain or palpitations  GASTROINTESTINAL: No abdominal  pain. No nausea, vomiting, or hematemesis  GENITOURINARY: No dysuria, frequency or hematuria  NEUROLOGICAL: No stroke like symptoms  SKIN: No rashes      TELEMETRY Personally reviewed: no events        PHYSICAL EXAM:  T(C): 36.6 (11-06-22 @ 20:17), Max: 37 (11-06-22 @ 15:45)  HR: 71 (11-06-22 @ 20:17) (60 - 80)  BP: 130/86 (11-06-22 @ 20:17) (102/69 - 130/86)  RR: 15 (11-06-22 @ 20:17) (15 - 18)  SpO2: 99% (11-06-22 @ 20:17) (97% - 99%)  Wt(kg): --  I&O's Summary    06 Nov 2022 07:01  -  06 Nov 2022 21:46  --------------------------------------------------------  IN: 120 mL / OUT: 0 mL / NET: 120 mL      Height (cm): 157.5 (11-06 @ 15:45)  Weight (kg): 79.4 (11-06 @ 15:45)  BMI (kg/m2): 32 (11-06 @ 15:45)  BSA (m2): 1.81 (11-06 @ 15:45)    Appearance: In no distress	  HEENT:    PERRL, EOMI	  Cardiovascular:  S1 S2, No JVD  Respiratory: Lungs clear to auscultation	  Gastrointestinal:  Soft, Non-tender, + BS	  Vascularature:  No edema of LE  Psychiatric: Appropriate affect   Neuro: no acute focal deficits           11-05    142  |  106  |  15  ----------------------------<  91  3.5   |  26  |  0.63    Ca    8.8      05 Nov 2022 06:45          Labs personally reviewed      end of copied text >  < from: Transthoracic Echocardiogram (07.07.21 @ 13:25) >  EF (Tipton Rule): 61 %Doppler Peak Velocity (m/sec):  AoV=1.2  ------------------------------------------------------------------------  Observations:  Mitral Valve: Normal mitral valve.  Aortic Valve/Aorta: Normal trileaflet aortic valve. Peak  transaortic valve gradient equals 6 mm Hg. Peak left  ventricular outflow tract gradient equals 2 mm Hg.  Aortic Root: 2.7 cm.  Left Atrium: Normal left atrium.  LA volume index = 25  cc/m2.  Left Ventricle: Endocardium not well visualized; grossly  normal left ventricular systolic function. Normal left  ventricular internal dimensions and wall thicknesses.  Right Heart: Normal right atrium. Normal right ventricular  size and systolic function. Normal tricuspid valve. Minimal  tricuspid regurgitation. Normal pulmonic valve. Minimal  pulmonic regurgitation.  Pericardium/Pleura: Normal pericardium with no pericardial  effusion.  Hemodynamic: Estimated right atrial pressure is 8 mm Hg.  ------------------------------------------------------------------------  Conclusions:  1. Normal trileaflet aortic valve.  2. Normal left atrium.  LA volume index = 25 cc/m2.  3.Normal left ventricular internal dimensions and wall  thicknesses.  4. Endocardium not well visualized; grossly normal left  ventricular systolic function.  5. Normal right ventricular size and systolic function.  *** No previous Echo exam.    < end of copied text >  < from: Cardiac Treadmill Stress Test (Non Imaging) (Cardiac Treadmill Stress Test (Non Imaging) .) (07.07.21 @ 15:59) >  STRESS TEST IMPRESSIONS:  Exercise capacity: 7 METS, Poor for age and gender.  Chest Pain: No chest pain with exercise.  Symptom: Fatigue.  HR Response: Appropriate.  BP Response: Appropriate.  Heart Rhythm: Sinus Rhythm - 83 BPM.  Baseline ECG: Nonspecific ST-T wave abnormality.  ECG Changes: ST Depression: Approximately 0.5 mm  horizontal in leads II, III, aVF, and V3-V6 started at  3:00 min of exercise at HR of 107 bpm.  At 5:00 min of  recovery at 110 bpm, the ST depressions in these leads  became approximately 0.5 mm downsloping and  persisted  11:19 min into recovery.  Arrhythmia: Rare VPDs occurred during stress.  *** No previous Nuclear/Stress exam.        Assessment /Plan:     Patient is a 46 y/o F with PMHx sig for gastric sleeve Feb, 2022, hypothyroidism who presents to the ED with syncopal episode. Pt had 1 syncopal episodes while sitting 1 month ago, but states she felt like she was going to pass out. Today had similar episode where she was sitting at desk and "slumped over" without any preceding symptoms. Denied chest pain, sob, palpitations, dizziness,  tremors, urinary incontinence. Witnessed by co-worker. No fall or head injury. After first syncopal episode pt followed up with neurology. pt states she had MRIs done which she assumes were negative. Was supposed to have EEG done. Has not followed with cardiologist. Eating/drinking well. No f/c, uri sx, urinary or bowel changes.    1. Syncope with no prodrome  - given no prodrome concerning for arrhythmogenic   - ? 2/2 UTI  - ECG NSR with no ischemia noted  - Prior TTE shows E 61%, unremarkable, repeat TTE unremarkable   - Treadmill ST from 2021 terminated early d/t fatigue, no CP noted  - Neurology consult appreciated: CTA unremarkable   - Prior syncopal episode 2 months ago had precipitating symptoms such as dizziness, nausea. This episode was not preceded by any precipitating symptoms. If above workup is negative, will consult EP.  - R/o Seizure vs. Acute infarct vs. orthostatic hypotension (although less likely since patient was sitting down during this episode) vs. cardiac   - EP consulted (house)  - ? 2/2 nutrient def 2/2 bariatric surgery, bariatric service consulted     2. Hypothyroidism  - TSH low but with normal T4    3. hx of Gastric Bypass  - Reports frequent GERD  - Reports adequate PO intake  - bariatric surgery consulted     4. UTI - abx per medicine           I had a prolonged conversation with the patient/family regarding hospital course, differential diagnosis and results of diagnostic tests.  Plan of care discussed with patient/family after the evaluation. Patient/family express clear understanding and satisfaction with the plan of care.  Sixty five minutes spent on encounter, of which more than fifty percent of the encounter was spent on counseling and/or coordinating care by the attending physician.      Felipe Mak DO [facility]  Cardiovascular Medicine  [address] [place] [address], [address]  Office: [phone]  Available via Text/call on Microsoft Teams

## 2022-11-06 NOTE — PROGRESS NOTE ADULT - ASSESSMENT
Patient is a 48 y/o F with PMHx sig for gastric sleeve 2022, hypothyroidism who presents to the ED with syncopal episode. 1 syncopal episodes while sitting 1 month ago. Today had similar episode where she was sitting at desk and "slumped over". No prodrome, chest pain, sob, palp, dizziness,  tremors, urinary incontinence. Witnessed by co-worker. No fall or head injury. After first syncopal ep, followed with neuro. Extensive w/u including MRIs done. Was supposed to have EEG done today. Has not followed with cardiologist. Eating/drinking well. No f/c, uri sx, urinary or bowel changes. No ingestions      Syncope  - Cardio and neurology follow ups appreciated  - Check TTE  - Check orthostatics      Hypothyroid  - Check TFts    UTI  - UCx in lab; F/u results       Full note to follow.      Patient is a 46 y/o F with PMHx significant for gastric sleeve in 2022, hypothyroidism, Pre-DM, HLD, who presents to [facility] with a chief complaint of Syncope. Patient reports that this is her second episode of syncope. First episode was on [place] day. Patient reports episode of dizziness prior to syncope. On Friday 11/04, patient with similar episode of syncope prompting her arrival. Patient denies chest pain, palpitations, SOB or dyspnea.     Syncope  - 2nd episodes; R/o Cardiac / neurological etiology  - Cardio and neurology follow ups appreciated  - Check TTE -- EF of 65%, Normal LV Systolic function; No segmental WMA; Normal Diastolic function; Mild KY   - CT H / CT A negative  - Check orthostatics  - Pt may require ILR placement to R/o occult arrythmia   - Check EEG to r/o seizure etiology per neuro as an outpatient w/ Dr. Felix   - Outpatient sleep study   - Monitor on Tele  - Fall, Seizure and aspiration precautions    Hypothyroid  - Check TFTs  - C/w Synthroid 150 PO Qd for now pending TSH    HLD  - LDL of 138  - Diet and Lifestyle modifications    Pre-DM  - Check A1C  - DIet and lifestyle modifications     Chronic cystitis   - On Macrobid at home; Start with Rocephin here  - F/u UCx --> In lab    H/O Gastric sleeve  - C/w PPI     PPX  - PPI   - Heparin 5K Q 8

## 2022-11-07 ENCOUNTER — TRANSCRIPTION ENCOUNTER (OUTPATIENT)
Age: 47
End: 2022-11-07

## 2022-11-07 VITALS
SYSTOLIC BLOOD PRESSURE: 133 MMHG | TEMPERATURE: 98 F | HEART RATE: 74 BPM | OXYGEN SATURATION: 99 % | RESPIRATION RATE: 18 BRPM | DIASTOLIC BLOOD PRESSURE: 85 MMHG

## 2022-11-07 DIAGNOSIS — R55 SYNCOPE AND COLLAPSE: ICD-10-CM

## 2022-11-07 LAB
24R-OH-CALCIDIOL SERPL-MCNC: 39.6 NG/ML — SIGNIFICANT CHANGE UP (ref 30–80)
A1C WITH ESTIMATED AVERAGE GLUCOSE RESULT: 5.7 % — HIGH (ref 4–5.6)
ANION GAP SERPL CALC-SCNC: 11 MMOL/L — SIGNIFICANT CHANGE UP (ref 5–17)
APTT BLD: 30.2 SEC — SIGNIFICANT CHANGE UP (ref 27.5–35.5)
BUN SERPL-MCNC: 13 MG/DL — SIGNIFICANT CHANGE UP (ref 7–23)
CALCIUM SERPL-MCNC: 9.3 MG/DL — SIGNIFICANT CHANGE UP (ref 8.4–10.5)
CHLORIDE SERPL-SCNC: 107 MMOL/L — SIGNIFICANT CHANGE UP (ref 96–108)
CO2 SERPL-SCNC: 22 MMOL/L — SIGNIFICANT CHANGE UP (ref 22–31)
CREAT SERPL-MCNC: 0.57 MG/DL — SIGNIFICANT CHANGE UP (ref 0.5–1.3)
CULTURE RESULTS: SIGNIFICANT CHANGE UP
EGFR: 111 ML/MIN/1.73M2 — SIGNIFICANT CHANGE UP
ESTIMATED AVERAGE GLUCOSE: 117 MG/DL — HIGH (ref 68–114)
FERRITIN SERPL-MCNC: 77 NG/ML — SIGNIFICANT CHANGE UP (ref 15–150)
FOLATE SERPL-MCNC: >20 NG/ML — SIGNIFICANT CHANGE UP
GLUCOSE SERPL-MCNC: 105 MG/DL — HIGH (ref 70–99)
HCT VFR BLD CALC: 38.7 % — SIGNIFICANT CHANGE UP (ref 34.5–45)
HGB BLD-MCNC: 12.2 G/DL — SIGNIFICANT CHANGE UP (ref 11.5–15.5)
INR BLD: 1.12 RATIO — SIGNIFICANT CHANGE UP (ref 0.88–1.16)
IRON SATN MFR SERPL: 21 % — SIGNIFICANT CHANGE UP (ref 14–50)
IRON SATN MFR SERPL: 76 UG/DL — SIGNIFICANT CHANGE UP (ref 30–160)
MCHC RBC-ENTMCNC: 27.3 PG — SIGNIFICANT CHANGE UP (ref 27–34)
MCHC RBC-ENTMCNC: 31.5 GM/DL — LOW (ref 32–36)
MCV RBC AUTO: 86.6 FL — SIGNIFICANT CHANGE UP (ref 80–100)
NRBC # BLD: 0 /100 WBCS — SIGNIFICANT CHANGE UP (ref 0–0)
PLATELET # BLD AUTO: 285 K/UL — SIGNIFICANT CHANGE UP (ref 150–400)
POTASSIUM SERPL-MCNC: 3.5 MMOL/L — SIGNIFICANT CHANGE UP (ref 3.5–5.3)
POTASSIUM SERPL-SCNC: 3.5 MMOL/L — SIGNIFICANT CHANGE UP (ref 3.5–5.3)
PROTHROM AB SERPL-ACNC: 13 SEC — SIGNIFICANT CHANGE UP (ref 10.5–13.4)
RBC # BLD: 4.47 M/UL — SIGNIFICANT CHANGE UP (ref 3.8–5.2)
RBC # FLD: 13.1 % — SIGNIFICANT CHANGE UP (ref 10.3–14.5)
SODIUM SERPL-SCNC: 140 MMOL/L — SIGNIFICANT CHANGE UP (ref 135–145)
SPECIMEN SOURCE: SIGNIFICANT CHANGE UP
TIBC SERPL-MCNC: 365 UG/DL — SIGNIFICANT CHANGE UP (ref 220–430)
TSH SERPL-MCNC: 0.23 UIU/ML — LOW (ref 0.27–4.2)
UIBC SERPL-MCNC: 289 UG/DL — SIGNIFICANT CHANGE UP (ref 110–370)
VIT B12 SERPL-MCNC: 810 PG/ML — SIGNIFICANT CHANGE UP (ref 232–1245)
WBC # BLD: 7.99 K/UL — SIGNIFICANT CHANGE UP (ref 3.8–10.5)
WBC # FLD AUTO: 7.99 K/UL — SIGNIFICANT CHANGE UP (ref 3.8–10.5)

## 2022-11-07 PROCEDURE — 83036 HEMOGLOBIN GLYCOSYLATED A1C: CPT

## 2022-11-07 PROCEDURE — 36415 COLL VENOUS BLD VENIPUNCTURE: CPT

## 2022-11-07 PROCEDURE — 85025 COMPLETE CBC W/AUTO DIFF WBC: CPT

## 2022-11-07 PROCEDURE — 85027 COMPLETE CBC AUTOMATED: CPT

## 2022-11-07 PROCEDURE — 99285 EMERGENCY DEPT VISIT HI MDM: CPT | Mod: 25

## 2022-11-07 PROCEDURE — 85730 THROMBOPLASTIN TIME PARTIAL: CPT

## 2022-11-07 PROCEDURE — 84100 ASSAY OF PHOSPHORUS: CPT

## 2022-11-07 PROCEDURE — 80061 LIPID PANEL: CPT

## 2022-11-07 PROCEDURE — 71046 X-RAY EXAM CHEST 2 VIEWS: CPT

## 2022-11-07 PROCEDURE — 93306 TTE W/DOPPLER COMPLETE: CPT

## 2022-11-07 PROCEDURE — 85610 PROTHROMBIN TIME: CPT

## 2022-11-07 PROCEDURE — 82962 GLUCOSE BLOOD TEST: CPT

## 2022-11-07 PROCEDURE — 70450 CT HEAD/BRAIN W/O DYE: CPT

## 2022-11-07 PROCEDURE — 84436 ASSAY OF TOTAL THYROXINE: CPT

## 2022-11-07 PROCEDURE — 84443 ASSAY THYROID STIM HORMONE: CPT

## 2022-11-07 PROCEDURE — 87086 URINE CULTURE/COLONY COUNT: CPT

## 2022-11-07 PROCEDURE — 99223 1ST HOSP IP/OBS HIGH 75: CPT

## 2022-11-07 PROCEDURE — 84484 ASSAY OF TROPONIN QUANT: CPT

## 2022-11-07 PROCEDURE — 80048 BASIC METABOLIC PNL TOTAL CA: CPT

## 2022-11-07 PROCEDURE — 70496 CT ANGIOGRAPHY HEAD: CPT | Mod: MA

## 2022-11-07 PROCEDURE — 87637 SARSCOV2&INF A&B&RSV AMP PRB: CPT

## 2022-11-07 PROCEDURE — 81025 URINE PREGNANCY TEST: CPT

## 2022-11-07 PROCEDURE — 99222 1ST HOSP IP/OBS MODERATE 55: CPT

## 2022-11-07 PROCEDURE — G0378: CPT

## 2022-11-07 PROCEDURE — 82746 ASSAY OF FOLIC ACID SERUM: CPT

## 2022-11-07 PROCEDURE — 83540 ASSAY OF IRON: CPT

## 2022-11-07 PROCEDURE — 83550 IRON BINDING TEST: CPT

## 2022-11-07 PROCEDURE — 83735 ASSAY OF MAGNESIUM: CPT

## 2022-11-07 PROCEDURE — 99233 SBSQ HOSP IP/OBS HIGH 50: CPT

## 2022-11-07 PROCEDURE — 82607 VITAMIN B-12: CPT

## 2022-11-07 PROCEDURE — 82728 ASSAY OF FERRITIN: CPT

## 2022-11-07 PROCEDURE — 80053 COMPREHEN METABOLIC PANEL: CPT

## 2022-11-07 PROCEDURE — 70498 CT ANGIOGRAPHY NECK: CPT | Mod: MA

## 2022-11-07 PROCEDURE — 82306 VITAMIN D 25 HYDROXY: CPT

## 2022-11-07 RX ORDER — CHLORHEXIDINE GLUCONATE 213 G/1000ML
1 SOLUTION TOPICAL
Refills: 0 | Status: DISCONTINUED | OUTPATIENT
Start: 2022-11-07 | End: 2022-11-07

## 2022-11-07 RX ADMIN — Medication 150 MICROGRAM(S): at 06:06

## 2022-11-07 RX ADMIN — Medication 100 MILLIGRAM(S): at 10:26

## 2022-11-07 RX ADMIN — CHLORHEXIDINE GLUCONATE 1 APPLICATION(S): 213 SOLUTION TOPICAL at 13:47

## 2022-11-07 NOTE — PROGRESS NOTE ADULT - NS ATTEND AMEND GEN_ALL_CORE FT
Patient care and plan discussed and reviewed with Advanced Care Provider. Plan as outlined above edited by me to reflect our discussion. I had a prolonged conversation with the patient/family regarding hospital course, differential diagnosis and results of diagnostic tests.  Plan of care discussed with patient/family after the evaluation. Patient/family express clear understanding and satisfaction with the plan of care.  Sixty five minutes spent on encounter, of which more than fifty percent of the encounter was spent on counseling and/or coordinating care by the attending physician.
Pt care and plan discussed and reviewed with PA. Plan as outlined above edited by me to reflect our discussion. Advanced care planning/advanced directives discussed with patient/family. DNR status including forceful chest compressions to attempt to restart the heart, ventilator support/artificial breathing, electric shock, artificial nutrition, health care proxy, Molst form all discussed with pt. Pt wishes to consider.    Discussed in detail with patient at the bedside. answered all her questions

## 2022-11-07 NOTE — CONSULT NOTE ADULT - SUBJECTIVE AND OBJECTIVE BOX
CHIEF COMPLAINT: No     HISTORY OF PRESENT ILLNESS: 47 year old female with PMHX significant for hypothyroidism, chronic cystitis, gastric sleeve procedure February 2022, pre menopausal, presents to ER with syncopal episode on Friday.  Patient states she had a syncopal episode this past October ([place] Day weekend)    Allergies    Bactrim (Pruritus)  sulfADIAZINE (Unknown)    Intolerances    MEDICATIONS:    nitrofurantoin monohydrate/macrocrystals (MACROBID) 100 milliGRAM(s) Oral <User Schedule>        pantoprazole    Tablet 40 milliGRAM(s) Oral before breakfast    levothyroxine 150 MICROGram(s) Oral daily    chlorhexidine 2% Cloths 1 Application(s) Topical <User Schedule>      PAST MEDICAL & SURGICAL HISTORY:  Adult hypothyroidism      Adult-onset obesity  BMI 37.6      Gastric band malfunction      Chronic cystitis  on macrobid daily      History of papilledema  Right eye; Followed by an opthomologist - No meds      History of cholecystectomy  possibly 2005      Hx of laparoscopic gastric banding  around 2008      S/P abdominoplasty  2012      History of suburethral sling procedure  2010          FAMILY HISTORY:  Family history of heart disease (Father)        SOCIAL HISTORY:    [ ] Non-smoker  [ ] Smoker  [ ] Alcohol      REVIEW OF SYSTEMS:  See HPI. Otherwise, 10 point ROS done and otherwise negative.    PHYSICAL EXAM:  T(C): 36.8 (11-07-22 @ 10:55), Max: 37 (11-06-22 @ 15:45)  HR: 74 (11-07-22 @ 10:55) (60 - 80)  BP: 137/86 (11-07-22 @ 10:55) (113/76 - 137/86)  RR: 18 (11-07-22 @ 10:55) (15 - 18)  SpO2: 96% (11-07-22 @ 10:55) (96% - 99%)  Wt(kg): --  I&O's Summary    06 Nov 2022 07:01  -  07 Nov 2022 07:00  --------------------------------------------------------  IN: 120 mL / OUT: 0 mL / NET: 120 mL        Appearance: Normal	  HEENT:   Normal oral mucosa, PERRL, EOMI	  Lymphatic: No lymphadenopathy  Cardiovascular: Normal S1 S2, No JVD, No murmurs, No edema  Respiratory: Lungs clear to auscultation	  Psychiatry: A & O x 3, Mood & affect appropriate  Gastrointestinal:  Soft, Non-tender, + BS	  Skin: No rashes, No ecchymoses, No cyanosis	  Neurologic: Non-focal  Extremities: Normal range of motion, No clubbing, cyanosis or edema  Vascular: Peripheral pulses palpable 2+ bilaterally        LABS:	 	    CBC Full  -  ( 07 Nov 2022 10:55 )  WBC Count : 7.99 K/uL  Hemoglobin : 12.2 g/dL  Hematocrit : 38.7 %  Platelet Count - Automated : 285 K/uL  Mean Cell Volume : 86.6 fl  Mean Cell Hemoglobin : 27.3 pg  Mean Cell Hemoglobin Concentration : 31.5 gm/dL  Auto Neutrophil # : x  Auto Lymphocyte # : x  Auto Monocyte # : x  Auto Eosinophil # : x  Auto Basophil # : x  Auto Neutrophil % : x  Auto Lymphocyte % : x  Auto Monocyte % : x  Auto Eosinophil % : x  Auto Basophil % : x    11-07    140  |  107  |  13  ----------------------------<  105<H>  3.5   |  22  |  0.57    Ca    9.3      07 Nov 2022 10:44      TELEMETRY: 	    ECG:  	  	     CHIEF COMPLAINT: "Im frustrated, I've been here for four days"  Patient is reluctant to provide medical history or syncope history as she is frustrated at this time.     HISTORY OF PRESENT ILLNESS: 47 year old female with PMHX significant for hypothyroidism, chronic cystitis, gastric sleeve procedure February 2022, pre menopausal, presents to ER with syncopal episode this past Friday.  Patient states she had a syncopal episode this past October ([place]ol[place] Day weekend).  On [place] Day weekend she states that she was standing up at kitchen cooking breakfast when she suddenly became hot, and dizzy and lost consciousness.  She hit her head and did not go to an ER.  She states that she saw an out patient Neurologist at that time and had a Brain MRI done.  On this past Friday she was at work seated at her desk talking with colleagues when she felt anxious during their conversation and then suddenly lost consciousness.  She did not fall, but she was slumped on her chair.  Both syncopal episodes did not have bowel or urine incontinence.    Patient denies any previous syncope.  Denies CP, palpitations, dizziness or lightheadedness, no recent illness.  Admits to current heavy menstrual bleeding a the time of syncope on Friday.  Patient believes Macrobid could be contributing to her dizziness.     Allergies    Bactrim (Pruritus)  sulfADIAZINE (Unknown)    Intolerances    MEDICATIONS:    nitrofurantoin monohydrate/macrocrystals (MACROBID) 100 milliGRAM(s) Oral <User Schedule>    pantoprazole    Tablet 40 milliGRAM(s) Oral before breakfast    levothyroxine 150 MICROGram(s) Oral daily    chlorhexidine 2% Cloths 1 Application(s) Topical <User Schedule>      PAST MEDICAL & SURGICAL HISTORY:  Adult hypothyroidism    Adult-onset obesity  BMI 37.6    Gastric band malfunction    Chronic cystitis  on macrobid daily    History of papilledema  Right eye; Followed by an opthomologist - No meds    History of cholecystectomy  possibly 2005    Hx of laparoscopic gastric banding  around 2008    S/P abdominoplasty  2012      History of suburethral sling procedure  2010    FAMILY HISTORY:  Family history of heart disease (Father)    REVIEW OF SYSTEMS:  See HPI. Otherwise, 10 point ROS done and otherwise negative.    PHYSICAL EXAM:  T(C): 36.8 (11-07-22 @ 10:55), Max: 37 (11-06-22 @ 15:45)  HR: 74 (11-07-22 @ 10:55) (60 - 80)  BP: 137/86 (11-07-22 @ 10:55) (113/76 - 137/86)  RR: 18 (11-07-22 @ 10:55) (15 - 18)  SpO2: 96% (11-07-22 @ 10:55) (96% - 99%)    06 Nov 2022 07:01  -  07 Nov 2022 07:00  --------------------------------------------------------  IN: 120 mL / OUT: 0 mL / NET: 120 mL    Appearance: Normal	  HEENT:   Normal oral mucosa, PERRL, EOMI	  Lymphatic: No lymphadenopathy  Cardiovascular: Normal S1 S2, regular.  No JVD, No murmurs, No edema  Respiratory: Lungs clear to auscultation	  Psychiatry: A & O x 3, Mood & affect appropriate  Gastrointestinal:  Soft, Non-tender, + BS	  Skin: No rashes, No ecchymoses, No cyanosis	  Extremities: Normal range of motion, No clubbing, cyanosis or edema  Vascular: Peripheral pulses palpable 2+ bilaterally      LABS:	 	    CBC Full  -  ( 07 Nov 2022 10:55 )  WBC Count : 7.99 K/uL  Hemoglobin : 12.2 g/dL  Hematocrit : 38.7 %  Platelet Count - Automated : 285 K/uL  Mean Cell Volume : 86.6 fl  Mean Cell Hemoglobin : 27.3 pg  Mean Cell Hemoglobin Concentration : 31.5 gm/dL  Auto Neutrophil # : x  Auto Lymphocyte # : x  Auto Monocyte # : x  Auto Eosinophil # : x  Auto Basophil # : x  Auto Neutrophil % : x  Auto Lymphocyte % : x  Auto Monocyte % : x  Auto Eosinophil % : x  Auto Basophil % : x    11-07    140  |  107  |  13  ----------------------------<  105<H>  3.5   |  22  |  0.57    Ca    9.3      07 Nov 2022 10:44      TELEMETRY: 	  NSR 50's-80's   ECG:  	NSR 62bpm   	  TTE:    Patient name: JOAN SINGER  YOB: 1975   Age: 47 (F)   MR#: [id number]  Study Date: 11/5/2022  Location: O/PSonographer: Anum Peters RDCS  Study quality: Technically fair  Referring Physician: Panchito Lizarraga MD  Blood Pressure: 132/84 mmHg  Height: 157 cm  Weight: 79 kg  BSA: 1.8 m2  ------------------------------------------------------------------------  PROCEDURE: Transthoracic echocardiogram with 2-D, M-Mode  and complete spectral and color flow Doppler. Verbal  consent was obtained for injection of  Ultrasonic Enhancing  Agent following a discussion of risks and benefits.  Following intravenous injection of Ultrasonic Enhancing  Agent, harmonic imaging was performed.  INDICATION: Syncope and collapse (R55)  ------------------------------------------------------------------------  Dimensions:    Normal Values:  LA:     3.3    2.0 - 4.0 cm  Ao:     3.0    2.0 - 3.8 cm  SEPTUM: 1.0    0.6 - 1.2 cm  PWT:    0.8    0.6 - 1.1 cm  LVIDd:  4.3    3.0 - 5.6 cm  LVIDs:  2.3   1.8 - 4.0 cm  Derived variables:  LVMI: 68 g/m2  RWT: 0.37  Fractional short: 47 %  EF (Modified Tipton Rule): 65 %Doppler Peak Velocity  (m/sec): AoV=1.5  ------------------------------------------------------------------------  Observations:  Mitral Valve: Normal mitral valve.  Aortic Valve/Aorta: Normal trileaflet aortic valve. Peak  transaortic valve gradient equals 9 mm Hg. Peak left  ventricular outflow tract gradient equals 4 mm Hg, mean  gradient is equal to 2 mm Hg, LVOT velocity time integral  equals 23 cm.  Aortic Root: 3 cm.  Ascending Aorta: 2.8 cm.  LVOT diameter: 2 cm.  Left Atrium: Normal left atrium.  LA volume index = 21  cc/m2.  Left Ventricle: Normal left ventricular systolic function.  No segmental wall motion abnormalities. Endocardial  visualization enhanced with intravenous injection of  Ultrasonic Enhancing Agent (Lumason). Normal left  ventricular internal dimensions and wall thicknesses.  Normal diastolic function  Right Heart: Normal right atrium. Normal right ventricular  size and function. Normal tricuspid valve. Normal pulmonic  valve. Mild pulmonic regurgitation.  Pericardium/Pleura: Normal pericardium with trace  pericardial effusion.  Hemodynamic: Estimated right atrial pressure is 8 mm Hg.  ------------------------------------------------------------------------  Conclusions:  1. Normal left ventricular internal dimensions and wall  thicknesses.  2. Normal left ventricular systolic function. No segmental  wall motion abnormalities. Endocardial visualization  enhanced with intravenous injection of Ultrasonic Enhancing  Agent (Lumason).  3. Normal diastolic function  4. Normal right ventricular size and function.  *** Compared with echocardiogram of 7/7/2021, no  significant changes noted.  ------------------------------------------------------------------------  Confirmed on  11/5/2022 - 11:32:17 by SWAPNA Reis  ------------------------------------------------------------------------

## 2022-11-07 NOTE — DISCHARGE NOTE PROVIDER - NSDCMRMEDTOKEN_GEN_ALL_CORE_FT
Macrobid 100 mg oral capsule: 1 cap(s) orally once a day  Synthroid 150 mcg (0.15 mg) oral tablet: 1 tab(s) orally once a day   Macrobid 100 mg oral capsule: 1 cap(s) orally once a day  omeprazole 20 mg oral delayed release capsule: 1 cap(s) orally once a day  Home medication  Synthroid 150 mcg (0.15 mg) oral tablet: 1 tab(s) orally once a day

## 2022-11-07 NOTE — DISCHARGE NOTE PROVIDER - NSDCFUSCHEDAPPT_GEN_ALL_CORE_FT
Marc Ding  [facility] Physician [facility]  GENSUR [address]  Scheduled Appointment: 01/12/2023    Joseph Ruano  [address]  Scheduled Appointment: 01/17/2023     Anne Hickman  [facility]  CARDIOLOGY [address]  Scheduled Appointment: 11/14/2022    Katelynn Forrest  [facility]  ELECTROPH [address]  Scheduled Appointment: 12/19/2022    Marc Ding  [facility]  GENSURG [address]  Scheduled Appointment: 01/12/2023    Joseph Ruano  [facility]  OPHTHALM [address] [facility]  Scheduled Appointment: 01/17/2023

## 2022-11-07 NOTE — DISCHARGE NOTE PROVIDER - NSDCCAREPROVSEEN_GEN_ALL_CORE_FT
The following findings require follow up:  Radiographic finding   Finding: MRI of the brain     Development of severe periventricular and white matter T2 hyperintensities in the supratentorial region and in the lavelle without any significant mass effect, enhancement or hemorrhage,Correlate with toxic encephalopathy    Follow-up at 3 months suggested   to demonstrate resolution      Follow up required: MRI brain   Follow up should be done within 3 month(s)    Please notify the following clinician to assist with the follow up:   Dr Yobani Dodds
Obdulio, Felipe Tran, Velia Renae

## 2022-11-07 NOTE — DISCHARGE NOTE NURSING/CASE MANAGEMENT/SOCIAL WORK - NSDCPEFALRISK_GEN_ALL_CORE
For information on Fall & Injury Prevention, visit: [email or web address] OR  [email or web address] OR  https://www.cdc.gov/steadi/patient.html

## 2022-11-07 NOTE — CONSULT NOTE ADULT - SUBJECTIVE AND OBJECTIVE BOX
BARIATRIC SURGERY CONSULT  ========================    HPI:  46 y/o F with PMHx sig for gastric sleeve Feb, 2022, hypothyroidism who presents to the ED with syncopal episode. Pt had 1 syncopal episodes while sitting 1 month ago, but states she felt like she was going to pass out. on 11/4 had a  similar episode where she was sitting at desk and "slumped over" without any preceding symptoms. Denied chest pain, sob, palpitations, dizziness,  tremors, urinary incontinence. Witnessed by co-worker. No fall or head injury. After first syncopal episode pt followed up with neurology. pt states she has had MRIs done which she assumes were negative. Was supposed to have EEG done. Has not followed with cardiologist. Eating/drinking well. No f/c, uri sx, urinary or bowel changes. (05 Nov 2022 13:18)      PAST MEDICAL & SURGICAL HISTORY:  Adult hypothyroidism      Adult-onset obesity  BMI 37.6      Gastric band malfunction      Chronic cystitis  on macrobid daily      History of papilledema  Right eye; Followed by an opthomologist - No meds      History of cholecystectomy  possibly 2005      Hx of laparoscopic gastric banding  around 2008      S/P abdominoplasty  2012      History of suburethral sling procedure  2010          MEDICATIONS  (STANDING):  levothyroxine 150 MICROGram(s) Oral daily  nitrofurantoin monohydrate/macrocrystals (MACROBID) 100 milliGRAM(s) Oral <User Schedule>  pantoprazole    Tablet 40 milliGRAM(s) Oral before breakfast      ALLERGIES:    ___________________________________________  REVIEW OF SYSTEMS:  All ROS negative except as per HPI.    ___________________________________________  VITALS:  Vital Signs Last 24 Hrs  T(C): 36.8 (07 Nov 2022 05:06), Max: 37 (06 Nov 2022 15:45)  T(F): 98.2 (07 Nov 2022 05:06), Max: 98.6 (06 Nov 2022 15:45)  HR: 60 (07 Nov 2022 05:06) (60 - 80)  BP: 113/76 (07 Nov 2022 05:06) (113/76 - 130/86)  BP(mean): --  RR: 18 (07 Nov 2022 05:06) (15 - 18)  SpO2: 97% (07 Nov 2022 05:06) (97% - 99%)    Parameters below as of 07 Nov 2022 05:06  Patient On (Oxygen Delivery Method): room air        CAPILLARY BLOOD GLUCOSE          I&O's Detail    06 Nov 2022 07:01  -  07 Nov 2022 06:18  --------------------------------------------------------  IN:    Oral Fluid: 120 mL  Total IN: 120 mL    OUT:  Total OUT: 0 mL    Total NET: 120 mL          PHYSICAL EXAM:  General: AAOx3, no acute distress.  Respiratory: breathing comfortably, no increased WOB   Abdomen: soft, nontender, nondistended, no rebound, no guarding  Extremities: Moves all four.     ____________________________________________  LABS:    11-05    142  |  106  |  15  ----------------------------<  91  3.5   |  26  |  0.63    Ca    8.8      05 Nov 2022 06:45                  ____________________________________________  RADIOLOGY & ADDITIONAL STUDIES:

## 2022-11-07 NOTE — DISCHARGE NOTE NURSING/CASE MANAGEMENT/SOCIAL WORK - PATIENT PORTAL LINK FT
You can access the FollowMyHealth Patient Portal offered by [facility] by registering at the following website: [email or web address]. By joining [facility]’s FollowMyHealth portal, you will also be able to view your health information using other applications (apps) compatible with our system.

## 2022-11-07 NOTE — CONSULT NOTE ADULT - ASSESSMENT
46 y/o F with PMHx sig for gastric sleeve Feb, 2022, hypothyroidism who presents to the ED with syncopal episode. Pt had 1 syncopal episodes while sitting 1 month ago, but states she felt like she was going to pass out. on 11/4 had a  similar episode where she was sitting at desk and "slumped over" without any preceding symptoms.      Overall chronic UTI, urine cx with contaminated specimen, no fever  minimal leucocytosis on presentation, which is now resolved.     PLAN:  No additional abx recommended at this time   she feels much better in terms of her urinary symptoms since macrobid was restarted.   no systemic signs of infection.   c/w suppressive therapy   follow with Dr Light as outpt.       Plan discussed with Cardiology Attending, Medicine NP.     Will sign off, please call with questions.     Harjinder Tran  Please contact through MS Teams   If no response or past 5 pm/weekend call [phone].

## 2022-11-07 NOTE — CONSULT NOTE ADULT - ATTENDING COMMENTS
HPI as per resident note, personally verified by me. Patient with recurrent episode of LOC. No associated symptoms such as convulsions, tongue or cheek biting, bowel/bladder incontinence, or focal neurologic deficits. Undergoing work-up for this as outpatient with neurologist Dr. Felix and had MRI brain with results unknown, pending ambulatory 48 hours vEEG this upcoming week. No further episodes.    Neurologic exam as per resident note with additions as below:  AAO x3, speech fluent  CN's II-XII intact  Strength 5/5 all  Sens intact all  FtN intact b/l  Downgoing b/l plantar response    A/P:  Syncope  Hypothyroidism  pre-DM  History of gastric sleeve  UTI on Macrobid    - Etiology for syncope more likely peripheral/cardiac in nature as opposed to neurologic (such as cerebrovascular or seizure) given lack of focal neurologic deficits and semiology. She had MRI brain as outpatient but results unknown so would recommend head imaging here with CT's (including vasculature) for completeness. Seizures are much less likely but cannot be entirely excluded. She asked about admission here to expedite this but told her realistically it could be several days until EEG is connected and then about 24-48 hours until it is completed and would be better off getting ambulatory done as outpatient next week. She verbalized agreement and understanding for this  - CT head w/o, CTA head/neck w/  - Orthostatic vital signs, cardiac work-up  - No neurologic contraindications to discharge if CT's unrevealing and patient is otherwise medically stable. She can follow-up with Dr. Yudi Felix as outpatient for further work-up, including previously scheduled EEG  - Continue to address above medical problems, as you are doing  - Will continue to follow patient peripherally with you, please call with additional questions or concerns ([id number])
47y F with hx of sleeve gastrectomy, and now admitted for workup of 2 separate episodes of syncope.  Eating normally, taking multivitamins, normal bowel fxn, no dysphagia/reflux or symptoms of malabsorption or dumping.  Vitals nl  Abdomen soft, nontender    Recommend sending bariatric nutrition panel to check vitamin levels  Neuro/cards workup    Jairon Hernandez MD

## 2022-11-07 NOTE — PROGRESS NOTE ADULT - SUBJECTIVE AND OBJECTIVE BOX
Patient is a 47y old  Female who presents with a chief complaint of syncope (07 Nov 2022 18:00)      SUBJECTIVE / OVERNIGHT EVENTS: ptn is perimenopausal, has sensation of heat sometimes, possible episodes of LOC are related to that. ptn also noticed more facial hair and losing scalp hair. ptn recommended to f/u w GYN for hormonal testing. Biotel placed for 30 day holter monitor w EPS f/u. she refused ILR. doubt  ptn needs to cont MACROBID, but she insists to cont    MEDICATIONS  (STANDING):  chlorhexidine 2% Cloths 1 Application(s) Topical <User Schedule>  levothyroxine 150 MICROGram(s) Oral daily  nitrofurantoin monohydrate/macrocrystals (MACROBID) 100 milliGRAM(s) Oral <User Schedule>  pantoprazole    Tablet 40 milliGRAM(s) Oral before breakfast    MEDICATIONS  (PRN):      Vital Signs Last 24 Hrs  T(F): 98.2 (11-07-22 @ 20:43), Max: 98.3 (11-07-22 @ 00:35)  HR: 64 (11-07-22 @ 20:43) (60 - 74)  BP: 117/83 (11-07-22 @ 20:43) (113/76 - 137/86)  RR: 18 (11-07-22 @ 20:43) (17 - 18)  SpO2: 99% (11-07-22 @ 20:43) (96% - 99%)  Telemetry:   CAPILLARY BLOOD GLUCOSE        I&O's Summary    06 Nov 2022 07:01  -  07 Nov 2022 07:00  --------------------------------------------------------  IN: 120 mL / OUT: 0 mL / NET: 120 mL    07 Nov 2022 07:01  -  07 Nov 2022 21:40  --------------------------------------------------------  IN: 1100 mL / OUT: 0 mL / NET: 1100 mL        PHYSICAL EXAM:  GENERAL: NAD, well-developed  HEAD:  Atraumatic, Normocephalic  EYES: EOMI, PERRLA, conjunctiva and sclera clear  NECK: Supple, No JVD  CHEST/LUNG: Clear to auscultation bilaterally; No wheeze  HEART: Regular rate and rhythm; No murmurs, rubs, or gallops  ABDOMEN: Soft, Nontender, Nondistended; Bowel sounds present  EXTREMITIES:  2+ Peripheral Pulses, No clubbing, cyanosis, or edema  PSYCH: AAOx3  NEUROLOGY: non-focal  SKIN: No rashes or lesions    LABS:                        12.2   7.99  )-----------( 285      ( 07 Nov 2022 10:55 )             38.7     11-07    140  |  107  |  13  ----------------------------<  105<H>  3.5   |  22  |  0.57    Ca    9.3      07 Nov 2022 10:44      PT/INR - ( 07 Nov 2022 10:44 )   PT: 13.0 sec;   INR: 1.12 ratio         PTT - ( 07 Nov 2022 10:44 )  PTT:30.2 sec          RADIOLOGY & ADDITIONAL TESTS:    Imaging Personally Reviewed:    Consultant(s) Notes Reviewed:      Care Discussed with Consultants/Other Providers:

## 2022-11-07 NOTE — DISCHARGE NOTE PROVIDER - NSDCFUADDAPPT_GEN_ALL_CORE_FT
APPTS ARE READY TO BE MADE: [x] YES    Best Family or Patient Contact (if needed):    Additional Information about above appointments (if needed):    1:   2:   3:     Other comments or requests:    APPTS ARE READY TO BE MADE: [x] YES    Best Family or Patient Contact (if needed):    Additional Information about above appointments (if needed):    1:   2:   3:     Other comments or requests: Patient was provided with doctors name and information and was advised to call to schedule follow up within specified time frame. At this time patient declined scheduling assistance.

## 2022-11-07 NOTE — CONSULT NOTE ADULT - CONSULT REQUESTED DATE/TIME
05-Nov-2022 11:28
05-Nov-2022 07:45
07-Nov-2022
07-Nov-2022 07:00
07-Nov-2022 06:18
07-Nov-2022 18:01

## 2022-11-07 NOTE — CONSULT NOTE ADULT - NS ATTEND AMEND GEN_ALL_CORE FT
Patient care and plan discussed and reviewed with Advanced Care Provider. Plan as outlined above edited by me to reflect our discussion.
seen and agree  although syncope may be vasovagal all symptoms are not classic for this  Clearly the patient should undergo monitoring, she wishes to try a 30 day monitor before proceeding with an ILR

## 2022-11-07 NOTE — DISCHARGE NOTE PROVIDER - NSDCCPCAREPLAN_GEN_ALL_CORE_FT
PRINCIPAL DISCHARGE DIAGNOSIS  Diagnosis: Syncope  Assessment and Plan of Treatment: - CTH non con, CTA Head and neck w contrast : negative for pathology  - ECG Normaql  -  No events on telemetry monitoring    - Orthostatic negative  - TTE normal - EF 65%, normal ventricular Function without adny WMA.  - Neurology consulted CTA unremarkable   - Bariatric service consulted to r/o causes related to nutrient deficiency.   - At this time, the bariatric surgery does not appear to be related to her syncope  - as per neuro:  EEG with outpatient neurology.  - Syncope due to unclear etiology  - EP consulted for ILR - Patient declined Loop recorder implant, states she does not want a foreign body inside of her  - 30 day monitor for external heart rhythm monitoring to look for cardiac reason for the syncope.  - TSH 0.18, check T3, T4   - Patient will follow up with Dr. Hickman out patient.  HOME CARE INSTRUCTIONS  Have someone stay with you until you feel stable.  Do not drive, operate machinery, or play sports until your caregiver says it is okay.  Keep all follow-up appointments as directed by your caregiver.   Lie down right away if you start feeling like you might faint. Breathe deeply and steadily. Wait until all the symptoms have passed.Drink enough fluids to keep your urine clear or pale yellow.  If you are taking blood pressure or heart medicine, get up slowly, taking several minutes to sit and then stand. This can reduce dizziness.  SEEK IMMEDIATE MEDICAL CARE IF:  You have a severe headache.  You have unusual pain in the chest, abdomen, or back.  You are bleeding from the mouth or rectum, or you have black or tarry stool.  You have an irregular or very fast heartbeat.  You have pain with breathing.  You have repeated fainting or seizure-like jerking during an episode.  You faint when sitting or lying down.  You have confusion.  You have difficulty walking.  You have severe weakness.  You have vision problems.  If you fainted, call your local emergency services (___________[phone]__________). Do not drive yourself to the hospital        SECONDARY DISCHARGE DIAGNOSES  Diagnosis: UTI (urinary tract infection)  Assessment and Plan of Treatment: - Pt on Macrobid for UTI PPx  - Urine culture contaminated      Diagnosis: Hypothyroid  Assessment and Plan of Treatment: - TSH low but with normal T4  - Continue Synthroid      Diagnosis: H/O gastric bypass  Assessment and Plan of Treatment: hx of Gastric Bypass  - Reports frequent GERD  - Reports adequate PO intake  -  Bariatric service consulted to r/o causes related to nutrient deficiency.   -  Her bariatric surgery does not appear to be related to her syncope  - Vitamin labs - B12, Folate, Vit D, Iron with total binding capacity, Ferritin, TSH, Coags. is normal   - Vitamin B1 to be done as outpt.     PRINCIPAL DISCHARGE DIAGNOSIS  Diagnosis: Syncope  Assessment and Plan of Treatment: - CTH non con, CTA Head and neck w contrast : negative for pathology  - ECG Normaql  -  No events on telemetry monitoring    - Orthostatic negative  - TTE normal - EF 65%, normal ventricular Function without adny WMA.  - Neurology consulted CTA unremarkable   - Bariatric service consulted to r/o causes related to nutrient deficiency.   - At this time, the bariatric surgery does not appear to be related to her syncope  - as per neuro:  EEG with outpatient neurology.  - Syncope due to unclear etiology  - EP consulted for ILR - Patient declined Loop recorder implant, states she does not want a foreign body inside of her  - 30 day monitor for external heart rhythm monitoring to look for cardiac reason for the syncope.  - TSH 0.18, check T3, T4   - Patient will follow up with Dr. Hickman out patient.  HOME CARE INSTRUCTIONS  Have someone stay with you until you feel stable.  Do not drive, operate machinery, or play sports until your caregiver says it is okay.  Keep all follow-up appointments as directed by your caregiver.   Lie down right away if you start feeling like you might faint. Breathe deeply and steadily. Wait until all the symptoms have passed.Drink enough fluids to keep your urine clear or pale yellow.  If you are taking blood pressure or heart medicine, get up slowly, taking several minutes to sit and then stand. This can reduce dizziness.  SEEK IMMEDIATE MEDICAL CARE IF:  You have a severe headache.  You have unusual pain in the chest, abdomen, or back.  You are bleeding from the mouth or rectum, or you have black or tarry stool.  You have an irregular or very fast heartbeat.  You have pain with breathing.  You have repeated fainting or seizure-like jerking during an episode.  You faint when sitting or lying down.  You have confusion.  You have difficulty walking.  You have severe weakness.  You have vision problems.  If you fainted, call your local emergency services (___________[phone]__________). Do not drive yourself to the hospital        SECONDARY DISCHARGE DIAGNOSES  Diagnosis: UTI (urinary tract infection)  Assessment and Plan of Treatment: - Pt on Macrobid for UTI PPx  - Urine culture contaminated      Diagnosis: Hypothyroid  Assessment and Plan of Treatment: - TSH low but with normal T4  - Continue Synthroid      Diagnosis: H/O gastric bypass  Assessment and Plan of Treatment: hx of Gastric Bypass  - Reports frequent GERD  - Reports adequate PO intake  -  Bariatric service consulted to r/o causes related to nutrient deficiency.   -  Her bariatric surgery does not appear to be related to her syncope  - Vitamin labs - B12, Folate, Vit D, Iron with total binding capacity, Ferritin, TSH, Coags. is normal   - Vitamin B1 to be done as outpt.    Diagnosis: GERD (gastroesophageal reflux disease)  Assessment and Plan of Treatment: Continue home dose omeprazole     PRINCIPAL DISCHARGE DIAGNOSIS  Diagnosis: Syncope  Assessment and Plan of Treatment: - CTH non con, CTA Head and neck w contrast : negative for pathology  - ECG Normaql  -  No events on telemetry monitoring    - Orthostatic negative  - TTE normal - EF 65%, normal ventricular Function without adny WMA.  - Neurology consulted CTA unremarkable   - Bariatric service consulted to r/o causes related to nutrient deficiency.   - At this time, the bariatric surgery does not appear to be related to her syncope  - as per neuro:  EEG with outpatient neurology.  - Syncope due to unclear etiology  - EP consulted for ILR - Patient declined Loop recorder implant, states she does not want a foreign body inside of her  - 30 day monitor for external heart rhythm monitoring to look for cardiac reason for the syncope.  - TSH 0.18, check T3, T4   - Patient will follow up with Dr. Hickman out patient.  HOME CARE INSTRUCTIONS  Have someone stay with you until you feel stable.  Do not drive, operate machinery, or play sports until your caregiver says it is okay.  Keep all follow-up appointments as directed by your caregiver.   Lie down right away if you start feeling like you might faint. Breathe deeply and steadily. Wait until all the symptoms have passed.Drink enough fluids to keep your urine clear or pale yellow.  If you are taking blood pressure or heart medicine, get up slowly, taking several minutes to sit and then stand. This can reduce dizziness.  SEEK IMMEDIATE MEDICAL CARE IF:  You have a severe headache.  You have unusual pain in the chest, abdomen, or back.  You are bleeding from the mouth or rectum, or you have black or tarry stool.  You have an irregular or very fast heartbeat.  You have pain with breathing.  You have repeated fainting or seizure-like jerking during an episode.  You faint when sitting or lying down.  You have confusion.  You have difficulty walking.  You have severe weakness.  You have vision problems.  If you fainted, call your local emergency services (___________[phone]__________). Do not drive yourself to the hospital        SECONDARY DISCHARGE DIAGNOSES  Diagnosis: UTI (urinary tract infection)  Assessment and Plan of Treatment: - Pt on Macrobid for UTI PPx  - Urine culture contaminated      Diagnosis: Hypothyroid  Assessment and Plan of Treatment: - TSH low but with normal T4  - Continue Synthroid      Diagnosis: H/O gastric bypass  Assessment and Plan of Treatment: hx of Gastric Bypass  - Reports frequent GERD  - Reports adequate PO intake  -  Bariatric service consulted to r/o causes related to nutrient deficiency.   -  Her bariatric surgery does not appear to be related to her syncope  - Vitamin labs - B12, Folate, Vit D, Iron with total binding capacity, Ferritin, TSH, Coags. is normal   - Vitamin B1 to be done as outpt.    Diagnosis: GERD (gastroesophageal reflux disease)  Assessment and Plan of Treatment: Continue home dose omeprazole    Diagnosis: Hyperlipidemia  Assessment and Plan of Treatment: You were recommended to start on cholesterol medication but you are still deciing  You were explained of the risks and benefits of elevated cholesterol.  Follow up with primary/cardiologist and discuss on cholesterol medication   Eat a heart healthy diet that is low in saturated fats and salt, and includes whole grains, fruits, vegetables and lean protein; exercise regularly (consult with your physician or cardiologist first); maintain a heart healthy weight;    Continue to follow with your primary physician or cardiologist.  Seek medical help for dizziness, Lightheadedness, Blurry vision, Headache, Chest pain, Shortness of breath       PRINCIPAL DISCHARGE DIAGNOSIS  Diagnosis: Syncope  Assessment and Plan of Treatment: - CTH non con, CTA Head and neck w contrast : negative for pathology  - ECG Normal  -  No events on telemetry monitoring    - Orthostatic negative  - TTE normal - EF 65%, normal ventricular Function without adny WMA.  - Neurology consulted CTA unremarkable   - Bariatric service consulted to r/o causes related to nutrient deficiency.   - At this time, the bariatric surgery does not appear to be related to her syncope  - as per neuro:  EEG with outpatient neurology.  - Syncope due to unclear etiology  - EP consulted for ILR - Patient declined Loop recorder implant, states she does not want a foreign body inside of her  - 30 day monitor for external heart rhythm monitoring to look for cardiac reason for the syncope.  - TSH 0.18, check T3, T4   - Patient will follow up with Dr. Hickman out patient.  HOME CARE INSTRUCTIONS  Have someone stay with you until you feel stable.  Do not drive, operate machinery, or play sports until your caregiver says it is okay.  Keep all follow-up appointments as directed by your caregiver.   Lie down right away if you start feeling like you might faint. Breathe deeply and steadily. Wait until all the symptoms have passed.Drink enough fluids to keep your urine clear or pale yellow.  If you are taking blood pressure or heart medicine, get up slowly, taking several minutes to sit and then stand. This can reduce dizziness.  SEEK IMMEDIATE MEDICAL CARE IF:  You have a severe headache.  You have unusual pain in the chest, abdomen, or back.  You are bleeding from the mouth or rectum, or you have black or tarry stool.  You have an irregular or very fast heartbeat.  You have pain with breathing.  You have repeated fainting or seizure-like jerking during an episode.  You faint when sitting or lying down.  You have confusion.  You have difficulty walking.  You have severe weakness.  You have vision problems.  If you fainted, call your local emergency services (___________[phone]__________). Do not drive yourself to the hospital        SECONDARY DISCHARGE DIAGNOSES  Diagnosis: UTI (urinary tract infection)  Assessment and Plan of Treatment: - Pt on Macrobid for UTI PPx  - Urine culture contaminated      Diagnosis: Hypothyroid  Assessment and Plan of Treatment: - TSH low but with normal T4  - Continue Synthroid      Diagnosis: H/O gastric bypass  Assessment and Plan of Treatment: hx of Gastric Bypass  - Reports frequent GERD  - Reports adequate PO intake  -  Bariatric service consulted to r/o causes related to nutrient deficiency.   -  Her bariatric surgery does not appear to be related to her syncope  - Vitamin labs - B12, Folate, Vit D, Iron with total binding capacity, Ferritin, TSH, Coags. is normal   - Vitamin B1 to be done as outpt.    Diagnosis: GERD (gastroesophageal reflux disease)  Assessment and Plan of Treatment: Continue home dose omeprazole    Diagnosis: Hyperlipidemia  Assessment and Plan of Treatment: You were recommended to start on cholesterol medication but you are still deciing  You were explained of the risks and benefits of elevated cholesterol.  Follow up with primary/cardiologist and discuss on cholesterol medication   Eat a heart healthy diet that is low in saturated fats and salt, and includes whole grains, fruits, vegetables and lean protein; exercise regularly (consult with your physician or cardiologist first); maintain a heart healthy weight;    Continue to follow with your primary physician or cardiologist.  Seek medical help for dizziness, Lightheadedness, Blurry vision, Headache, Chest pain, Shortness of breath

## 2022-11-07 NOTE — DISCHARGE NOTE PROVIDER - HOSPITAL COURSE
46 y/o F with PMHx sig for hypothyroidism, chronic cystitis, gastric sleeve Feb, 2022, hypothyroidism presented to the ED with syncopal episode. Patient states she had a syncopal episode this past October ([place] Day weekend),Today had similar episode where she was sitting at desk and "slumped over" without any preceding symptoms. After first syncopal episode pt followed up with neurology. pt states she had MRIs done which she assumes were negative. Was supposed to have EEG done. Has not followed with cardiologist.     1. Syncope with no prodrome.   - CTH non con, CTA Head and neck w contrast : negative for pathology  - given no prodrome concerning for arrhythmogenic.  - ECG NSR with no ischemia noted  -  No events on tele.    - Orthostatic negative  - TTE normal - EF 65%, normal ventricular Function without adny WMA.  - Treadmill ST from 2021 terminated early d/t fatigue, no CP noted  - Neurology consulted CTA unremarkable   - Bariatric service consulted to r/o causes related to nutrient deficiency.   - At this time, the bariatric surgery does not appear to be related to her syncope  - as per neuro:  EEG with outpatient neurology      UTI with Dysuria  Pt started on Macrobid for UTI recently  UCx positive for GNR  ID consulted     Hypothyroidism  - TSH low but with normal T4     hx of Gastric Bypass  - Reports frequent GERD  - Reports adequate PO intake  -  Bariatric service consulted to r/o causes related to nutrient deficiency.   -  Her bariatric surgery does not appear to be related to her syncope  - Vitamin labs - B12, Folate, Vit D, Iron with total binding capacity, Ferritin, TSH, Coags. is normal   - Vitamin B1 to be done as outpt.         48 y/o F with PMHx sig for hypothyroidism, chronic cystitis, gastric sleeve Feb, 2022, hypothyroidism presented to the ED with syncopal episode. Patient states she had a syncopal episode this past October ([place] Day weekend),Today had similar episode where she was sitting at desk and "slumped over" without any preceding symptoms. After first syncopal episode pt followed up with neurology. pt states she had MRIs done which she assumes were negative. Was supposed to have EEG done. Has not followed with cardiologist.     1. Syncope with no prodrome.   - CTH non con, CTA Head and neck w contrast : negative for pathology  - given no prodrome concerning for arrhythmogenic.  - ECG NSR with no ischemia noted  -  No events on tele.    - Orthostatic negative  - TTE normal - EF 65%, normal ventricular Function without adny WMA.  - Treadmill ST from 2021 terminated early d/t fatigue, no CP noted  - Neurology consulted CTA unremarkable   - Bariatric service consulted to r/o causes related to nutrient deficiency.   - At this time, the bariatric surgery does not appear to be related to her syncope  - as per neuro:  EEG with outpatient neurology.  - Syncope due to unclear etiology  - EP consulted for ILR - Patient declined Loop recorder implant, states she does not want a foreign body inside of her  - Upon discharge please call [phone] for 30 day monitor for external arrythmia monitoring   - TSH 0.18, check T3, T4   - Patient will follow up with Dr. Hickman out patient    UTI  - Pt on Macrobid for UTI PPx  - UCx contaminated    Hypothyroidism  - TSH low but with normal T4     hx of Gastric Bypass  - Reports frequent GERD  - Reports adequate PO intake  -  Bariatric service consulted to r/o causes related to nutrient deficiency.   -  Her bariatric surgery does not appear to be related to her syncope  - Vitamin labs - B12, Folate, Vit D, Iron with total binding capacity, Ferritin, TSH, Coags. is normal   - Vitamin B1 to be done as outpt.    Medically cleared for discharge by Dr. Briggs with outpt follow up with cardiologist - Dr. Hickman and PMD - Dr. Roe   46 y/o F with PMHx sig for hypothyroidism, chronic cystitis, gastric sleeve Feb, 2022, hypothyroidism presented to the ED with syncopal episode. Patient states she had a syncopal episode this past October ([place] Day weekend),Today had similar episode where she was sitting at desk and "slumped over" without any preceding symptoms. After first syncopal episode pt followed up with neurology. pt states she had MRIs done which she assumes were negative. Was supposed to have EEG done. Has not followed with cardiologist.     1. Syncope with no prodrome.   - CTH non con, CTA Head and neck w contrast : negative for pathology  - given no prodrome concerning for arrhythmogenic.  - ECG NSR with no ischemia noted  -  No events on tele.    - Orthostatic negative  - TTE normal - EF 65%, normal ventricular Function without adny WMA.  - Treadmill ST from 2021 terminated early d/t fatigue, no CP noted  - Neurology consulted CTA unremarkable   - Bariatric service consulted to r/o causes related to nutrient deficiency.   - At this time, the bariatric surgery does not appear to be related to her syncope  - as per neuro:  EEG with outpatient neurology.  - Syncope due to unclear etiology  - EP consulted for ILR - Patient declined Loop recorder implant, states she does not want a foreign body inside of her  - Upon discharge please call [phone] for 30 day monitor for external arrythmia monitoring   - TSH 0.18, check T3, T4   - Patient will follow up with Dr. Hickman out patient    UTI  - Pt on Macrobid for UTI PPx  - UCx contaminated  - ID consulted - ok for discharge on home dose macrobid    Hypothyroidism  - TSH low but with normal T4     hx of Gastric Bypass  - Reports frequent GERD  - Reports adequate PO intake  -  Bariatric service consulted to r/o causes related to nutrient deficiency.   -  Her bariatric surgery does not appear to be related to her syncope  - Vitamin labs - B12, Folate, Vit D, Iron with total binding capacity, Ferritin, TSH, Coags. is normal   - Vitamin B1 to be done as outpt.    Medically cleared for discharge by Dr. Briggs with outpt follow up with cardiologist - Dr. Hickman and PMD - Dr. Roe

## 2022-11-07 NOTE — CONSULT NOTE ADULT - ASSESSMENT
47 year old female with PMHX significant for hypothyroidism, chronic cystitis, gastric sleeve procedure February 2022, pre menopausal, presents to ER with syncopal episode this past Friday.  Patient states she had a syncopal episode this past October ([place] Day weekend).  On [place] Day weekend she states that she was standing up at kitchen cooking breakfast when she suddenly became hot, and dizzy and lost consciousness.  She hit her head and did not go to an ER.  She states that she saw an out patient Neurologist at that time and had a Brain MRI done.  On this past Friday she was at work seated at her desk talking with colleagues when she felt anxious during their conversation and then suddenly lost consciousness.  She did not fall, but she was slumped on her chair.  Both syncopal episodes did not have bowel or urine incontinence.  Patient denies any previous syncope.  Admits to current heavy menstrual bleeding a the time of syncope on Friday.  Patient believes Macrobid could be contributing to her dizziness.    1. Syncope with Prodrome of dizziness/ hot sensation/ anxiousness  2. s/p gastric sleeve procedure February 2022   3. hypothyroidism with TSH 0.18  4. Normal left ventricular systolic function    - Discussed ILR implant at [facility].  Patient declined Loop recorder implant, states she does not want a foreign body inside of her  - Upon discharge please call [phone] for 30 day monitor for external arrythmia monitoring   - TSH 0.18, check T3, T4   - Case discussed with Dr. Mak, patient will follow up with Dr. Hickman out patient    [facility]  [phone]  47 year old female with PMHX significant for hypothyroidism, chronic cystitis, gastric sleeve procedure February 2022, pre menopausal, presents to ER with syncopal episode this past Friday.  Patient states she had a syncopal episode this past October ([place] Day weekend).  On [place] Day weekend she states that she was standing up at kitchen cooking breakfast when she suddenly became hot, and dizzy and lost consciousness.  She hit her head and did not go to an ER.  She states that she saw an out patient Neurologist at that time and had a Brain MRI done.  On this past Friday she was at work seated at her desk talking with colleagues when she felt anxious during their conversation and then suddenly lost consciousness.  She did not fall, but she was slumped on her chair.  Both syncopal episodes did not have bowel or urine incontinence.  Patient denies any previous syncope.  Admits to current heavy menstrual bleeding a the time of syncope on Friday.  Patient believes Macrobid could be contributing to her dizziness.    1. Syncope with Prodrome of dizziness/ hot sensation for October episode and anxiousness prior to Syncope on Friday   2. s/p gastric sleeve procedure February 2022   3. hypothyroidism with TSH 0.18  4. Normal left ventricular systolic function    - Discussed ILR implant at length.  Patient declined Loop recorder implant, states she does not want a foreign body inside of her  - Upon discharge please call [phone] for 30 day monitor for [employer] external arrythmia monitoring- send results to Dr. Forrest   - TSH 0.18, check T3, T4   - Case discussed with Dr. Mak, patient will follow up with Dr. Hickman out patient  - Will arrange for EP Clinic follow up in 5-6weeks with Dr. Adriane Waggoner [facility]  [phone]

## 2022-11-07 NOTE — CONSULT NOTE ADULT - SUBJECTIVE AND OBJECTIVE BOX
Patient is a 47-year-old female with history of laparoscopic sleeve gastrectomy in February 2022, hypothyroidism, presenting to ED after 2 syncopal episodes. Her first episode was around 1 month ago, and her second and most recent episode was while at work sitting. Both times she had complete loss of consciousness. Patient denies any symptoms preceding either event, such as palpitations, chest pain, shortness of breath, tingling or paraesthesias, shaking or jitteriness. She was seen by a neurologist after her first episode and underwent an MRI, which was reportedly unremarkable. She had an appointment to followup with a cardiologist outpatient, but her second episode on 11/4, prompted her ED visit and subsequent admission. In regards to her bariatric surgery: patient denies any difficulty eating or drinking, no dysphagia, reflux, nausea, vomiting. Normal bowel function. Denies any abdominal pain. Syncopal episode not temporally related to eating. Of note, patient has been on antibiotics for a UTI, currently denies any dysuria.    PMH: hypothyroidism, morbid obesity    PSH: Gastric band with removal (2008), Sleeve gastrectomy (2022), cholecystectomy Patient is a 47-year-old female with history of laparoscopic sleeve gastrectomy in February 2022, hypothyroidism, presenting to ED after 2 syncopal episodes. Her first episode was around 1 month ago, and her second and most recent episode was while at work sitting. Both times she had complete loss of consciousness. Patient denies any symptoms preceding either event, such as palpitations, chest pain, shortness of breath, tingling or paraesthesias, shaking or jitteriness. She was seen by a neurologist after her first episode and underwent an MRI, which was reportedly unremarkable. She had an appointment to followup with a cardiologist outpatient, but her second episode on 11/4, prompted her ED visit and subsequent admission. In regards to her bariatric surgery: patient denies any difficulty eating or drinking, no dysphagia, reflux, nausea, vomiting. Normal bowel function. Denies any abdominal pain. Syncopal episode not temporally related to eating. Of note, patient has been on antibiotics for a UTI, currently denies any dysuria.    PMH: hypothyroidism, morbid obesity    PSH: Gastric band with removal (2008), Sleeve gastrectomy (2022), cholecystectomy    MEDICATIONS  (STANDING):  levothyroxine 150 MICROGram(s) Oral daily  nitrofurantoin monohydrate/macrocrystals (MACROBID) 100 milliGRAM(s) Oral <User Schedule>  pantoprazole    Tablet 40 milliGRAM(s) Oral before breakfast      ALLERGIES: Bactrim, Sulfadiazine    ___________________________________________  REVIEW OF SYSTEMS:  All ROS negative except as per HPI.    ___________________________________________  VITALS:  Vital Signs Last 24 Hrs  T(C): 36.8 (07 Nov 2022 05:06), Max: 37 (06 Nov 2022 15:45)  T(F): 98.2 (07 Nov 2022 05:06), Max: 98.6 (06 Nov 2022 15:45)  HR: 60 (07 Nov 2022 05:06) (60 - 80)  BP: 113/76 (07 Nov 2022 05:06) (113/76 - 130/86)  BP(mean): --  RR: 18 (07 Nov 2022 05:06) (15 - 18)  SpO2: 97% (07 Nov 2022 05:06) (97% - 99%)    Parameters below as of 07 Nov 2022 05:06  Patient On (Oxygen Delivery Method): room air    I&O's Detail    06 Nov 2022 07:01  -  07 Nov 2022 06:18  --------------------------------------------------------  IN:  Oral Fluid: 120 mL  Total IN: 120 mL    OUT:  Total OUT: 0 mL    Total NET: 120 mL      PHYSICAL EXAM:  General: NAD  HEENT: NCAT, PERRLA, MMM  Respiratory: b/l breath sounds  CV: +s1/s2  Abdomen: soft, nontender, nondistended, no rebound, no guarding  Extremities: MAEx4, WWP  Neuro: CN II-XII grossly intact  Psych: Normal affect    ____________________________________________  LABS:    WBC 11.09  HGB 13.6  HCT: 42.4  PLT: 282    Na: 142  K: 3.5  Cl: 106  CO2: 26  BUN: 15  Crt: 0.6  Glu: 91    ____________________________________________

## 2022-11-07 NOTE — PROGRESS NOTE ADULT - SUBJECTIVE AND OBJECTIVE BOX
DATE OF SERVICE: 11-07-22 @ 10:15    Patient is a 47y old  Female who presents with a chief complaint of syncope (07 Nov 2022 08:11)      INTERVAL HISTORY: Feels ok.     REVIEW OF SYSTEMS:  CONSTITUTIONAL: No weakness  EYES/ENT: No visual changes;  No throat pain   NECK: No pain or stiffness  RESPIRATORY: No cough, wheezing; No shortness of breath  CARDIOVASCULAR: No chest pain or palpitations  GASTROINTESTINAL: No abdominal  pain. No nausea, vomiting, or hematemesis  GENITOURINARY: No dysuria, frequency or hematuria  NEUROLOGICAL: No stroke like symptoms  SKIN: No rashes    TELEMETRY Personally reviewed:   	  MEDICATIONS:        PHYSICAL EXAM:  T(C): 36.8 (11-07-22 @ 05:06), Max: 37 (11-06-22 @ 15:45)  HR: 60 (11-07-22 @ 05:06) (60 - 80)  BP: 113/76 (11-07-22 @ 05:06) (113/76 - 130/86)  RR: 18 (11-07-22 @ 05:06) (15 - 18)  SpO2: 97% (11-07-22 @ 05:06) (97% - 99%)  Wt(kg): --  I&O's Summary    06 Nov 2022 07:01  -  07 Nov 2022 07:00  --------------------------------------------------------  IN: 120 mL / OUT: 0 mL / NET: 120 mL      Height (cm): 157.5 (11-06 @ 15:45)  Weight (kg): 79.4 (11-06 @ 15:45)  BMI (kg/m2): 32 (11-06 @ 15:45)  BSA (m2): 1.81 (11-06 @ 15:45)    Appearance: In no distress	  HEENT:    PERRL, EOMI	  Cardiovascular:  S1 S2, No JVD  Respiratory: Lungs clear to auscultation	  Gastrointestinal:  Soft, Non-tender, + BS	  Vascularature:  No edema of LE  Psychiatric: Appropriate affect   Neuro: no acute focal deficits                     Labs personally reviewed      ASSESSMENT/PLAN: 	    Patient is a 46 y/o F with PMHx sig for gastric sleeve Feb, 2022, hypothyroidism who presents to the ED with syncopal episode. Pt had 1 syncopal episodes while sitting 1 month ago, but states she felt like she was going to pass out. Today had similar episode where she was sitting at desk and "slumped over" without any preceding symptoms. Denied chest pain, sob, palpitations, dizziness,  tremors, urinary incontinence. Witnessed by co-worker. No fall or head injury. After first syncopal episode pt followed up with neurology. pt states she had MRIs done which she assumes were negative. Was supposed to have EEG done. Has not followed with cardiologist. Eating/drinking well. No f/c, uri sx, urinary or bowel changes.    1. Syncope with no prodrome  - given no prodrome concerning for arrhythmogenic   - ? 2/2 UTI  - ECG NSR with no ischemia noted  - Prior TTE shows E 61%, unremarkable, repeat TTE unremarkable   - Treadmill ST from 2021 terminated early d/t fatigue, no CP noted  - Neurology consult appreciated: CTA unremarkable   - Prior syncopal episode 2 months ago had precipitating symptoms such as dizziness, nausea. This episode was not preceded by any precipitating symptoms. If above workup is negative, will consult EP.  - R/o Seizure vs. Acute infarct vs. orthostatic hypotension (although less likely since patient was sitting down during this episode) vs. cardiac   - EP consulted (house)  - ? 2/2 nutrient def 2/2 bariatric surgery, bariatric service consulted     2. Hypothyroidism  - TSH low but with normal T4    3. hx of Gastric Bypass  - Reports frequent GERD  - Reports adequate PO intake  - bariatric surgery consulted     4. UTI - abx per medicine         Nena Rodrigues, AG-SILVA Mak DO [facility]  Cardiovascular Medicine  [address], [address]  Available through call or text on [employer] TEAM[employer]  Office: [phone]   DATE OF SERVICE: 11-07-22 @ 10:15    Patient is a 47y old  Female who presents with a chief complaint of syncope (07 Nov 2022 08:11)      INTERVAL HISTORY: Feels ok.     REVIEW OF SYSTEMS:  CONSTITUTIONAL: No weakness  EYES/ENT: No visual changes;  No throat pain   NECK: No pain or stiffness  RESPIRATORY: No cough, wheezing; No shortness of breath  CARDIOVASCULAR: No chest pain or palpitations  GASTROINTESTINAL: No abdominal  pain. No nausea, vomiting, or hematemesis  GENITOURINARY: No dysuria, frequency or hematuria  NEUROLOGICAL: No stroke like symptoms  SKIN: No rashes    TELEMETRY Personally reviewed: SR 50-80s  	  MEDICATIONS:        PHYSICAL EXAM:  T(C): 36.8 (11-07-22 @ 05:06), Max: 37 (11-06-22 @ 15:45)  HR: 60 (11-07-22 @ 05:06) (60 - 80)  BP: 113/76 (11-07-22 @ 05:06) (113/76 - 130/86)  RR: 18 (11-07-22 @ 05:06) (15 - 18)  SpO2: 97% (11-07-22 @ 05:06) (97% - 99%)  Wt(kg): --  I&O's Summary    06 Nov 2022 07:01  -  07 Nov 2022 07:00  --------------------------------------------------------  IN: 120 mL / OUT: 0 mL / NET: 120 mL      Height (cm): 157.5 (11-06 @ 15:45)  Weight (kg): 79.4 (11-06 @ 15:45)  BMI (kg/m2): 32 (11-06 @ 15:45)  BSA (m2): 1.81 (11-06 @ 15:45)    Appearance: In no distress	  HEENT:    PERRL, EOMI	  Cardiovascular:  S1 S2, No JVD  Respiratory: Lungs clear to auscultation	  Gastrointestinal:  Soft, Non-tender, + BS	  Vascularature:  No edema of LE  Psychiatric: Appropriate affect   Neuro: no acute focal deficits                     Labs personally reviewed      ASSESSMENT/PLAN: 	    Patient is a 48 y/o F with PMHx sig for gastric sleeve Feb, 2022, hypothyroidism who presents to the ED with syncopal episode. Pt had 1 syncopal episodes while sitting 1 month ago, but states she felt like she was going to pass out. Today had similar episode where she was sitting at desk and "slumped over" without any preceding symptoms. Denied chest pain, sob, palpitations, dizziness,  tremors, urinary incontinence. Witnessed by co-worker. No fall or head injury. After first syncopal episode pt followed up with neurology. pt states she had MRIs done which she assumes were negative. Was supposed to have EEG done. Has not followed with cardiologist. Eating/drinking well. No f/c, uri sx, urinary or bowel changes.    1. Syncope with no prodrome  - given no prodrome concerning for arrhythmogenic   - ? 2/2 UTI  - ECG NSR with no ischemia noted  - Prior TTE shows E 61%, unremarkable, repeat TTE unremarkable   - Treadmill ST from 2021 terminated early d/t fatigue, no CP noted  - Neurology consult appreciated: CTA unremarkable   - Prior syncopal episode 2 months ago had precipitating symptoms such as dizziness, nausea. This episode was not preceded by any precipitating symptoms. If above workup is negative, will consult EP.  - R/o Seizure vs. Acute infarct vs. orthostatic hypotension (although less likely since patient was sitting down during this episode) vs. cardiac   - EP consulted (house)  - ? 2/2 nutrient def 2/2 bariatric surgery, bariatric service consulted     2. Hypothyroidism  - TSH low but with normal T4    3. hx of Gastric Bypass  - Reports frequent GERD  - Reports adequate PO intake  - bariatric surgery consulted     4. UTI - abx per medicine         Nena Rodrigues, AG-NP   Felipe Mak DO [facility]  Cardiovascular Medicine  [address], [address]  Available through call or text on [employer] TEAM[employer]  Office: [phone]   DATE OF SERVICE: 11-07-22 @ 10:15    Patient is a 47y old  Female who presents with a chief complaint of syncope (07 Nov 2022 08:11)      INTERVAL HISTORY: Feels ok.     REVIEW OF SYSTEMS:  CONSTITUTIONAL: No weakness  EYES/ENT: No visual changes;  No throat pain   NECK: No pain or stiffness  RESPIRATORY: No cough, wheezing; No shortness of breath  CARDIOVASCULAR: No chest pain or palpitations  GASTROINTESTINAL: No abdominal  pain. No nausea, vomiting, or hematemesis  GENITOURINARY: No dysuria, frequency or hematuria  NEUROLOGICAL: No stroke like symptoms  SKIN: No rashes    TELEMETRY Personally reviewed: SR 50-80s  	  MEDICATIONS:        PHYSICAL EXAM:  T(C): 36.8 (11-07-22 @ 05:06), Max: 37 (11-06-22 @ 15:45)  HR: 60 (11-07-22 @ 05:06) (60 - 80)  BP: 113/76 (11-07-22 @ 05:06) (113/76 - 130/86)  RR: 18 (11-07-22 @ 05:06) (15 - 18)  SpO2: 97% (11-07-22 @ 05:06) (97% - 99%)  Wt(kg): --  I&O's Summary    06 Nov 2022 07:01  -  07 Nov 2022 07:00  --------------------------------------------------------  IN: 120 mL / OUT: 0 mL / NET: 120 mL      Height (cm): 157.5 (11-06 @ 15:45)  Weight (kg): 79.4 (11-06 @ 15:45)  BMI (kg/m2): 32 (11-06 @ 15:45)  BSA (m2): 1.81 (11-06 @ 15:45)    Appearance: In no distress	  HEENT:    PERRL, EOMI	  Cardiovascular:  S1 S2, No JVD  Respiratory: Lungs clear to auscultation	  Gastrointestinal:  Soft, Non-tender, + BS	  Vascularature:  No edema of LE  Psychiatric: Appropriate affect   Neuro: no acute focal deficits                     Labs personally reviewed      ASSESSMENT/PLAN: 	    Patient is a 48 y/o F with PMHx sig for gastric sleeve Feb, 2022, hypothyroidism who presents to the ED with syncopal episode. Pt had 1 syncopal episodes while sitting 1 month ago, but states she felt like she was going to pass out. Today had similar episode where she was sitting at desk and "slumped over" without any preceding symptoms. Denied chest pain, sob, palpitations, dizziness,  tremors, urinary incontinence. Witnessed by co-worker. No fall or head injury. After first syncopal episode pt followed up with neurology. pt states she had MRIs done which she assumes were negative. Was supposed to have EEG done. Has not followed with cardiologist. Eating/drinking well. No f/c, uri sx, urinary or bowel changes.    1. Syncope with no prodrome  - given no prodrome concerning for arrhythmogenic   - ? 2/2 UTI  - ECG NSR with no ischemia noted  - Prior TTE shows E 61%, unremarkable, repeat TTE unremarkable   - Treadmill ST from 2021 terminated early d/t fatigue, no CP noted  - Neurology consult appreciated: CTA unremarkable   - Prior syncopal episode 2 months ago had precipitating symptoms such as dizziness, nausea. This episode was not preceded by any precipitating symptoms.   - EP consulted, recs ILR, pt declined, Biotel on discharge+  - ? 2/2 nutrient def 2/2 bariatric surgery, bariatric service consulted, recs appreciated, vitamin def sent off    2. Hypothyroidism  - TSH low but with normal T4  - OP follow up with her endocrine    3. hx of Gastric Bypass  - Reports frequent GERD  - Reports adequate PO intake  - bariatric surgery consulted, recs appreciated, vitamin def sent off    4. UTI - abx   - >100k organisms  - likely contaminant  - ID consulted  - recs continuing Macrobid         Nena Rodrigues, REGINE-NP   Felipe Mak DO [facility]  Cardiovascular Medicine  [address], [address]  Available through call or text on [employer] TEAM[employer]  Office: [phone]

## 2022-11-07 NOTE — CONSULT NOTE ADULT - SUBJECTIVE AND OBJECTIVE BOX
Patient is a 47y old  Female who presents with a chief complaint of syncope (07 Nov 2022 15:25)      HPI:  48 y/o F with PMHx sig for gastric sleeve Feb, 2022, hypothyroidism who presents to the ED with syncopal episode. Pt had 1 syncopal episodes while sitting 1 month ago, but states she felt like she was going to pass out. on 11/4 had a  similar episode where she was sitting at desk and "slumped over" without any preceding symptoms. Denied chest pain, sob, palpitations, dizziness,  tremors, urinary incontinence. Witnessed by co-worker. No fall or head injury. After first syncopal episode pt followed up with neurology. pt states she has had MRIs done which she assumes were negative. Was supposed to have EEG done. Has not followed with cardiologist. Eating/drinking well. No f/c, uri sx, urinary or bowel changes. (05 Nov 2022 13:18)  Above reviewed:   pt with chronic UTI's since childhood, seeing Dr Light from urogyn, has been on macrobid for chronic suppression for years and was doing well, last admission for UTI >20 years ago, has tried hiprex, even fosfomycin for suppression but it does not work. She was having dysuria, frequency, foul smelling urine until she was restarted on macrobid. She is worried about her syncope and wants to know what caused it. No fever or chills, no suprapubic or CVA tenderness.         PAST MEDICAL & SURGICAL HISTORY:  Adult hypothyroidism      Adult-onset obesity  BMI 37.6      Gastric band malfunction      Chronic cystitis  on macrobid daily      History of papilledema  Right eye; Followed by an opthomologist - No meds      History of cholecystectomy  possibly 2005      Hx of laparoscopic gastric banding  around 2008      S/P abdominoplasty  2012      History of suburethral sling procedure  2010          REVIEW OF SYSTEMS    General: No Fevers, no chills     Skin: No rash  	  Ophthalmologic: Denies any discharge, redness.  	  ENMT: No nasal congestion or throat pain.     Respiratory and Thorax: No cough, sputum. Denies shortness of breath.  	  Cardiovascular: No chest pain,     Gastrointestinal: No nausea, abdominal pain or diarrhea.    Genitourinary: per HPI      Musculoskeletal: No joint swelling     Neurological: No new extremity weakness.    Psychiatric: No hallucinations	    Extremities: No swelling     Endocrine: No abnormal heat or cold intolerance     Allergic/Immunologic:	No hives        Social history:  , lives with family, no smoking.       FAMILY HISTORY:  Family history of heart disease (Father)          Allergies  Bactrim (Pruritus)  sulfADIAZINE (Unknown)            Antimicrobials:  nitrofurantoin monohydrate/macrocrystals (MACROBID) 100 milliGRAM(s) Oral <User Schedule>        Vital Signs Last 24 Hrs  T(C): 36.8 (07 Nov 2022 10:55), Max: 36.8 (07 Nov 2022 00:35)  T(F): 98.3 (07 Nov 2022 10:55), Max: 98.3 (07 Nov 2022 00:35)  HR: 74 (07 Nov 2022 10:55) (60 - 74)  BP: 137/86 (07 Nov 2022 10:55) (113/76 - 137/86)  BP(mean): --  RR: 18 (07 Nov 2022 10:55) (15 - 18)  SpO2: 96% (07 Nov 2022 10:55) (96% - 99%)    Parameters below as of 07 Nov 2022 10:55  Patient On (Oxygen Delivery Method): room air        PHYSICAL EXAM: Patient in no acute distress.    Constitutional: Comfortable. Awake and alert    Eyes: No discharge or conjunctival injection    ENMT: No thrush. No pharyngeal erythema.    Neck: Supple,     Respiratory:  + air entry bilaterally.    Cardiovascular: S1 S2 wnl,     Gastrointestinal: Soft BS(+) no tenderness, non distended.    Genitourinary: No CVA tenderness     Extremities: No edema.    Vascular: peripheral pulses felt    Neurological: No new gross focal deficits.    Skin: No rash     Musculoskeletal: No joint swelling.    Psychiatric: Affect normal.                              12.2   7.99  )-----------( 285      ( 07 Nov 2022 10:55 )             38.7       11-07    140  |  107  |  13  ----------------------------<  105<H>  3.5   |  22  |  0.57    Ca    9.3      07 Nov 2022 10:44          Culture - Urine (collected 06 Nov 2022 05:27)  Source: Clean Catch Clean Catch (Midstream)  Final Report (07 Nov 2022 17:21):    >=3 organisms. Probable collection contamination.          Radiology: Imaging reviewed and visualized personally [ x]  < from: Xray Chest 2 Views PA/Lat (11.04.22 @ 14:51) >  IMPRESSION:  Clear lungs.        < from: CT Angio Head w/ IV Cont (11.05.22 @ 14:22) >  IMPRESSION:    Head CT:    No acute intracranial abnormality is noted.    If the patient has new and persistent unexplained symptoms, consider   short interval follow-up head CT or brain MRI follow-up.    CTA NECK:    No significant carotid or vertebral artery stenosis.    CTA HEAD:    No evidence for focal stenosis, major vessel occlusion, or aneurysm about   the [place].

## 2022-11-07 NOTE — DISCHARGE NOTE PROVIDER - CARE PROVIDER_API CALL
Felipe Mak (DO)  Cardiovascular Disease; Internal Medicine; Nuclear Cardiology  [address] [facility], [place] [address]  Phone: [phone]  Fax: [phone]  Follow Up Time: 1-3 days    Velia Rodriguez)  Internal Medicine  [address] [place] [address], [place] [address]  Phone: [phone]  Fax: [phone]  Follow Up Time:    Anne Hickman)  Cardiovascular Disease; Interventional Cardiology  [address] [place] [address]  Phone: [phone]  Fax: [phone]  Follow Up Time: 1-3 days    Velia Rodriguez)  Internal Medicine  [address] [place] [address], [place] [address]  Phone: [phone]  Fax: [phone]  Follow Up Time: 1-3 days
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
## 2022-11-07 NOTE — CONSULT NOTE ADULT - PROBLEM SELECTOR RECOMMENDATION 9
- At this time, the bariatric surgery does not appear to be related to her syncope  - Recommend obtaining full nutritional and vitamin labs, including: Vit B1, B12, Folate, Vit D, Iron with total binding capacity, Ferritin, TSH, Coags.  - Follow-up cardiology recommendations  - OK for diet when appropriate

## 2022-11-07 NOTE — DISCHARGE NOTE PROVIDER - PROVIDER TOKENS
PROVIDER:[TOKEN:[[id number]],FOLLOWUP:[1-3 days]],PROVIDER:[TOKEN:[[id number]]] PROVIDER:[TOKEN:[[id number]],FOLLOWUP:[1-3 days]],PROVIDER:[TOKEN:[[id number]],FOLLOWUP:[1-3 days]]

## 2022-11-07 NOTE — DISCHARGE NOTE PROVIDER - CARE PROVIDERS DIRECT ADDRESSES
,DirectAddress_Unknown,DirectAddress_Unknown ,[email or web address],DirectAddress_Unknown

## 2022-11-08 ENCOUNTER — NON-APPOINTMENT (OUTPATIENT)
Age: 47
End: 2022-11-08

## 2022-11-14 ENCOUNTER — APPOINTMENT (OUTPATIENT)
Dept: CARDIOLOGY | Facility: CLINIC | Age: 47
End: 2022-11-14
Payer: COMMERCIAL

## 2022-11-14 ENCOUNTER — NON-APPOINTMENT (OUTPATIENT)
Age: 47
End: 2022-11-14

## 2022-11-14 VITALS
SYSTOLIC BLOOD PRESSURE: 123 MMHG | WEIGHT: 176 LBS | HEIGHT: 62 IN | HEART RATE: 80 BPM | BODY MASS INDEX: 32.39 KG/M2 | OXYGEN SATURATION: 98 % | TEMPERATURE: 98 F | DIASTOLIC BLOOD PRESSURE: 83 MMHG | RESPIRATION RATE: 16 BRPM

## 2022-11-14 PROCEDURE — 93000 ELECTROCARDIOGRAM COMPLETE: CPT

## 2022-11-14 PROCEDURE — 99213 OFFICE O/P EST LOW 20 MIN: CPT | Mod: 25

## 2022-11-14 NOTE — DISCUSSION/SUMMARY
[FreeTextEntry1] : The patient is a 47-year-old female hypothyroid, s/p gastric sleeve with 70 pound weight loss and new syncope..\par #1 CV- normal ECG, echo normal and event monitor in place\par #2 Hypothyroid- continue levothyroxine\par #3 Surgery- s/p broken lap band removed and gastric sleeve now down 70 pounds.Recommend compression stockings, increase hydration and salt. [EKG obtained to assist in diagnosis and management of assessed problem(s)] : EKG obtained to assist in diagnosis and management of assessed problem(s)

## 2022-11-14 NOTE — HISTORY OF PRESENT ILLNESS
[FreeTextEntry1] : Serenity had syncope at home on [place] day. Does not remember what happened. On 11/4  she was sitting at her desk and she was witnessed to had syncope. She threw up when she woke up. Admitted to NS. She has Biotel for one month. Recommended ILR but refused.

## 2022-12-08 ENCOUNTER — NON-APPOINTMENT (OUTPATIENT)
Age: 47
End: 2022-12-08

## 2022-12-08 ENCOUNTER — TRANSCRIPTION ENCOUNTER (OUTPATIENT)
Age: 47
End: 2022-12-08

## 2022-12-12 ENCOUNTER — APPOINTMENT (OUTPATIENT)
Dept: ELECTROPHYSIOLOGY | Facility: CLINIC | Age: 47
End: 2022-12-12
Payer: COMMERCIAL

## 2022-12-12 ENCOUNTER — NON-APPOINTMENT (OUTPATIENT)
Age: 47
End: 2022-12-12

## 2022-12-12 VITALS
OXYGEN SATURATION: 98 % | HEART RATE: 82 BPM | SYSTOLIC BLOOD PRESSURE: 121 MMHG | WEIGHT: 175 LBS | DIASTOLIC BLOOD PRESSURE: 81 MMHG | HEIGHT: 62 IN | BODY MASS INDEX: 32.2 KG/M2

## 2022-12-12 DIAGNOSIS — R55 SYNCOPE AND COLLAPSE: ICD-10-CM

## 2022-12-12 PROCEDURE — 93000 ELECTROCARDIOGRAM COMPLETE: CPT

## 2022-12-12 PROCEDURE — 99214 OFFICE O/P EST MOD 30 MIN: CPT | Mod: 25

## 2022-12-12 RX ORDER — LIOTHYRONINE SODIUM 5 UG/1
5 TABLET ORAL
Qty: 90 | Refills: 0 | Status: DISCONTINUED | COMMUNITY
Start: 2021-03-04 | End: 2022-12-12

## 2022-12-12 RX ORDER — PAROXETINE HYDROCHLORIDE 20 MG/1
20 TABLET, FILM COATED ORAL DAILY
Qty: 90 | Refills: 3 | Status: DISCONTINUED | COMMUNITY
Start: 2022-12-12 | End: 2022-12-12

## 2022-12-12 RX ORDER — CYANOCOBALAMIN 1000 UG/ML
1000 INJECTION INTRAMUSCULAR; SUBCUTANEOUS
Qty: 2 | Refills: 0 | Status: DISCONTINUED | COMMUNITY
Start: 2021-03-04 | End: 2022-12-12

## 2022-12-12 RX ORDER — NITROFURANTOIN MACROCRYSTALS 100 MG/1
100 CAPSULE ORAL
Refills: 0 | Status: DISCONTINUED | COMMUNITY
End: 2022-12-12

## 2022-12-12 RX ORDER — CEPHALEXIN 500 MG/1
500 CAPSULE ORAL
Qty: 20 | Refills: 0 | Status: DISCONTINUED | COMMUNITY
Start: 2022-07-05

## 2022-12-12 RX ORDER — NITROFURANTOIN (MONOHYDRATE/MACROCRYSTALS) 25; 75 MG/1; MG/1
100 CAPSULE ORAL
Qty: 30 | Refills: 0 | Status: ACTIVE | COMMUNITY
Start: 2022-09-08

## 2022-12-12 RX ORDER — FOSFOMYCIN TROMETHAMINE 3 G/1
3 POWDER ORAL
Qty: 3 | Refills: 0 | Status: DISCONTINUED | COMMUNITY
Start: 2022-09-08

## 2022-12-12 RX ORDER — LIRAGLUTIDE 6 MG/ML
INJECTION, SOLUTION SUBCUTANEOUS
Refills: 0 | Status: DISCONTINUED | COMMUNITY
End: 2022-12-12

## 2022-12-13 NOTE — REASON FOR VISIT
[Symptom and Test Evaluation] : symptom and test evaluation [Spouse] : spouse [FreeTextEntry3] : Dr. Anne Hickman

## 2022-12-13 NOTE — PHYSICAL EXAM
[Well Developed] : well developed [Well Nourished] : well nourished [No Acute Distress] : no acute distress [Normal Conjunctiva] : normal conjunctiva [Normal Venous Pressure] : normal venous pressure [No Carotid Bruit] : no carotid bruit [Normal S1, S2] : normal S1, S2 [No Murmur] : no murmur [Clear Lung Fields] : clear lung fields [Good Air Entry] : good air entry [No Respiratory Distress] : no respiratory distress  [Soft] : abdomen soft [Non Tender] : non-tender [No Masses/organomegaly] : no masses/organomegaly [Normal Gait] : normal gait [No Edema] : no edema [No Cyanosis] : no cyanosis [No Clubbing] : no clubbing [No Rash] : no rash [No Skin Lesions] : no skin lesions [Moves all extremities] : moves all extremities [No Focal Deficits] : no focal deficits [Normal] : alert and oriented, normal memory [Alert and Oriented] : alert and oriented [Normal memory] : normal memory

## 2022-12-13 NOTE — HISTORY OF PRESENT ILLNESS
[FreeTextEntry1] : I had the pleasure of seeing your patient Serenity Castellon today in the arrhythmia clinic of [facility]. As you well know, she is a pleasant 47 year old woman with a history of hypothyroidism, post gastric sleeve placement and neurocardiogenic syncope. The patient reports feeling well, until October 2022 around [place] day when she had an syncopal episode that occurred while she was standing in the kitchen preparing breakfast and had a sudden loss of consciousness.  She was hospitalized at [facility] in early November 2022, for recurrent witnessed syncopal event that occurred while she was seated at work. Her cardiac workup was benign and she was discharged with an MCOT. She reports no prodrome for 2 of the episodes but feeling slightly dizzy prior to one. Although she denied any nausea, her  stated that she did say she was nauseous after the last episode. On Dec 8th she had a recurrent syncopal episode, again while standing in the kitchen. On monitor she had a 21s sinus pause, that correlated with the episode and was preceded by sinus slowing C/W a vagal mechanism. . Her  found her on the kitchen floor at that time, and reports her being unresponsive for a few seconds. She does not recall any clear trigger for her episodes. She denies palpitations, chest pain, orthopnea/pnd. She denies episodes prior to October and denies symptoms post the event. She has not been compliant with hydration or compression stockings.  She currently works as a school psychologist. \par \par Transthoracic echocardiography performed in November 2022, revealed normal left ventricular size, thickness and LV function, calculated ejection fraction of 65%, Normal RV size and systolic function. \par \par Exercise  stress test performed in July 2021, she exercised 6 min, reaching 7METS. 0.5 mm ST depressions were noted that persisted 11 minutes into recovery. Rare VPDs were noted in recovery. \par \par Today in clinic the patient  overall doing well and reports no palpitations, lightheadedness, pre-syncope or syncope. Her blood pressure today was 121/81 and his pulse was 82 and regular. His EKG demonstrated sinus rhythm at 83 with a AR interval of 166 ms, 410 ms, QRSd 90 ms. .\par

## 2022-12-13 NOTE — DISCUSSION/SUMMARY
[FreeTextEntry1] : In summary this is a 47 year old female who is post gastric sleeve placement with recurrent malignant  vasovagal syncope and noted 21 s sinus arrest on an MCOT. She has no significant prodrome prior to her symptoms. \par \par We discussed pathophysiology of neurocardiogenic syncope, its etiology, triggers and management. We discussed cognitive behavioral therapy and its limitations given her limited prodrome, we discussed hydration strategies, management with SSRIs and finally pacing to manage the profound cardioinhibitor response that she has. Given that she does not have a significant prodrome and with the long pause I am concerned she could suffer a traumatic event. I recommend that she undergo a leadless pacemaker placement and start po paroxetine ER 25 mg daily to manage vasodepressor symptoms.  The pacemaker would prevent the prolonged pauses and I have seen (and there is a randomized trial) that Paxil can prevent episodes. All her questions were answered. The rationale for the procedure as well as its risks--including but not limited to bleeding, vascular injury, pericardial effusion/tamponade, device embolization, stroke, and death--were reviewed in detail. After consideration of this information, the decision was made to proceed with the procedure.\par  [EKG obtained to assist in diagnosis and management of assessed problem(s)] : EKG obtained to assist in diagnosis and management of assessed problem(s)

## 2023-01-11 LAB
25(OH)D3 SERPL-MCNC: 55 NG/ML
ALBUMIN SERPL ELPH-MCNC: 4.3 G/DL
ALP BLD-CCNC: 99 U/L
ALT SERPL-CCNC: 29 U/L
ANION GAP SERPL CALC-SCNC: 15 MMOL/L
AST SERPL-CCNC: 18 U/L
BASOPHILS # BLD AUTO: 0.05 K/UL
BASOPHILS NFR BLD AUTO: 0.5 %
BILIRUB SERPL-MCNC: 0.7 MG/DL
BUN SERPL-MCNC: 12 MG/DL
CALCIUM SERPL-MCNC: 9.3 MG/DL
CHLORIDE SERPL-SCNC: 100 MMOL/L
CO2 SERPL-SCNC: 25 MMOL/L
CREAT SERPL-MCNC: 0.64 MG/DL
EGFR: 108 ML/MIN/1.73M2
EOSINOPHIL # BLD AUTO: 0.25 K/UL
EOSINOPHIL NFR BLD AUTO: 2.7 %
ESTIMATED AVERAGE GLUCOSE: 111 MG/DL
FOLATE SERPL-MCNC: >20 NG/ML
GLUCOSE SERPL-MCNC: 64 MG/DL
HBA1C MFR BLD HPLC: 5.5 %
HCT VFR BLD CALC: 41.9 %
HGB BLD-MCNC: 12.9 G/DL
IMM GRANULOCYTES NFR BLD AUTO: 0.3 %
IRON SERPL-MCNC: 56 UG/DL
LYMPHOCYTES # BLD AUTO: 3.09 K/UL
LYMPHOCYTES NFR BLD AUTO: 33.6 %
MAN DIFF?: NORMAL
MCHC RBC-ENTMCNC: 27.1 PG
MCHC RBC-ENTMCNC: 30.8 GM/DL
MCV RBC AUTO: 88 FL
MONOCYTES # BLD AUTO: 0.56 K/UL
MONOCYTES NFR BLD AUTO: 6.1 %
NEUTROPHILS # BLD AUTO: 5.23 K/UL
NEUTROPHILS NFR BLD AUTO: 56.8 %
PLATELET # BLD AUTO: 343 K/UL
POTASSIUM SERPL-SCNC: 4.8 MMOL/L
PROT SERPL-MCNC: 7.3 G/DL
RBC # BLD: 4.76 M/UL
RBC # FLD: 12.5 %
SODIUM SERPL-SCNC: 140 MMOL/L
VIT B12 SERPL-MCNC: 1163 PG/ML
WBC # FLD AUTO: 9.21 K/UL

## 2023-01-12 ENCOUNTER — APPOINTMENT (OUTPATIENT)
Dept: SURGERY | Facility: CLINIC | Age: 48
End: 2023-01-12
Payer: COMMERCIAL

## 2023-01-12 VITALS
OXYGEN SATURATION: 98 % | BODY MASS INDEX: 31.74 KG/M2 | WEIGHT: 172.5 LBS | HEIGHT: 62 IN | TEMPERATURE: 96.9 F | RESPIRATION RATE: 18 BRPM

## 2023-01-12 VITALS — DIASTOLIC BLOOD PRESSURE: 88 MMHG | SYSTOLIC BLOOD PRESSURE: 147 MMHG | HEART RATE: 56 BPM

## 2023-01-12 DIAGNOSIS — Z98.84 BARIATRIC SURGERY STATUS: ICD-10-CM

## 2023-01-12 PROCEDURE — 99213 OFFICE O/P EST LOW 20 MIN: CPT

## 2023-01-12 RX ORDER — PAROXETINE 25 MG/1
25 TABLET, FILM COATED, EXTENDED RELEASE ORAL DAILY
Qty: 90 | Refills: 3 | Status: DISCONTINUED | COMMUNITY
Start: 2022-12-12 | End: 2023-01-12

## 2023-01-12 NOTE — ASSESSMENT
[___ Years Post Op] : [unfilled] years [Previous Visit Weight: ___] : Previous visit weight: [unfilled] lbs [Cormobidities: ___] : Comorbidities: [unfilled] [Today's Weight: ___] : Today's weight:[unfilled] lbs [Today's BMI: ___] : Today's BMI: [unfilled] [de-identified] : Plan:\par Continue high-protein low-carb diet\par Consider pacemaker placement as recommended by MOISE MENESES\par Increase activities as tolerated and permitted by cardiology\par Check blood work\par Continue vitamins daily\par Follow-up 6 months

## 2023-01-12 NOTE — PHYSICAL EXAM
[de-identified] : anicteric, mucous membranes moist  [de-identified] : CTA  [de-identified] : RRR [de-identified] : abdomen soft nontender nondistended [de-identified] : healed

## 2023-01-12 NOTE — HISTORY OF PRESENT ILLNESS
[Procedure: ___] : Procedure performed: [unfilled]  [Date of Surgery: ___] : Date of Surgery:   [unfilled] [Surgeon Name:   ___] : Surgeon Name: Dr. DARLING [Pre-Op Weight ___] : Pre-op weight was [unfilled] lbs [___ Months Post Op] : [unfilled] months [Diabetes] : Diabetes [de-identified] : Recently patient had episodes of syncope with LOC, seen by cardiology/arrhythmia clinic- MCOT showed prolonged pauses (>20 seconds); patient uncomfortable about undergoing leadless pacemaker placement, is going for 2nd opinion\par \par Patient otherwise pleased with results to date but disappointed by the bradycardia arrhythmia she has been diagnosed with [Hypertension] : no Hypertension [Sleep Apnea] : no Sleep Apnea [GERD] : no GERD [Hyperlipidemia] : no Hyperlipidemia

## 2023-01-15 ENCOUNTER — TRANSCRIPTION ENCOUNTER (OUTPATIENT)
Age: 48
End: 2023-01-15

## 2023-01-17 LAB
VIT B1 SERPL-MCNC: 138.4 NMOL/L
VIT B6 SERPL-MCNC: 22.9 UG/L
ZINC SERPL-MCNC: 68 UG/DL

## 2023-01-30 ENCOUNTER — APPOINTMENT (OUTPATIENT)
Dept: OPHTHALMOLOGY | Facility: CLINIC | Age: 48
End: 2023-01-30
Payer: COMMERCIAL

## 2023-01-30 ENCOUNTER — OUTPATIENT (OUTPATIENT)
Dept: OUTPATIENT SERVICES | Facility: HOSPITAL | Age: 48
LOS: 1 days | End: 2023-01-30
Payer: COMMERCIAL

## 2023-01-30 ENCOUNTER — NON-APPOINTMENT (OUTPATIENT)
Age: 48
End: 2023-01-30

## 2023-01-30 DIAGNOSIS — Z11.52 ENCOUNTER FOR SCREENING FOR COVID-19: ICD-10-CM

## 2023-01-30 DIAGNOSIS — Z98.890 OTHER SPECIFIED POSTPROCEDURAL STATES: Chronic | ICD-10-CM

## 2023-01-30 LAB — SARS-COV-2 RNA SPEC QL NAA+PROBE: SIGNIFICANT CHANGE UP

## 2023-01-30 PROCEDURE — 92083 EXTENDED VISUAL FIELD XM: CPT

## 2023-01-30 PROCEDURE — U0003: CPT

## 2023-01-30 PROCEDURE — 92133 CPTRZD OPH DX IMG PST SGM ON: CPT

## 2023-01-30 PROCEDURE — 99214 OFFICE O/P EST MOD 30 MIN: CPT

## 2023-01-30 PROCEDURE — C9803: CPT

## 2023-01-30 PROCEDURE — U0005: CPT

## 2023-01-31 ENCOUNTER — NON-APPOINTMENT (OUTPATIENT)
Age: 48
End: 2023-01-31

## 2023-02-03 ENCOUNTER — OUTPATIENT (OUTPATIENT)
Dept: INPATIENT UNIT | Facility: HOSPITAL | Age: 48
LOS: 1 days | End: 2023-02-03
Payer: COMMERCIAL

## 2023-02-03 ENCOUNTER — TRANSCRIPTION ENCOUNTER (OUTPATIENT)
Age: 48
End: 2023-02-03

## 2023-02-03 VITALS
WEIGHT: 171.96 LBS | SYSTOLIC BLOOD PRESSURE: 114 MMHG | TEMPERATURE: 98 F | OXYGEN SATURATION: 99 % | DIASTOLIC BLOOD PRESSURE: 75 MMHG | RESPIRATION RATE: 16 BRPM | HEART RATE: 70 BPM | HEIGHT: 62 IN

## 2023-02-03 DIAGNOSIS — Z98.890 OTHER SPECIFIED POSTPROCEDURAL STATES: Chronic | ICD-10-CM

## 2023-02-03 DIAGNOSIS — R55 SYNCOPE AND COLLAPSE: ICD-10-CM

## 2023-02-03 LAB
ANION GAP SERPL CALC-SCNC: 11 MMOL/L — SIGNIFICANT CHANGE UP (ref 5–17)
APTT BLD: 23 SEC — LOW (ref 27.5–35.5)
BLD GP AB SCN SERPL QL: NEGATIVE — SIGNIFICANT CHANGE UP
BUN SERPL-MCNC: 13 MG/DL — SIGNIFICANT CHANGE UP (ref 7–23)
CALCIUM SERPL-MCNC: 9 MG/DL — SIGNIFICANT CHANGE UP (ref 8.4–10.5)
CHLORIDE SERPL-SCNC: 105 MMOL/L — SIGNIFICANT CHANGE UP (ref 96–108)
CO2 SERPL-SCNC: 23 MMOL/L — SIGNIFICANT CHANGE UP (ref 22–31)
CREAT SERPL-MCNC: 0.53 MG/DL — SIGNIFICANT CHANGE UP (ref 0.5–1.3)
EGFR: 113 ML/MIN/1.73M2 — SIGNIFICANT CHANGE UP
GLUCOSE SERPL-MCNC: 92 MG/DL — SIGNIFICANT CHANGE UP (ref 70–99)
HCT VFR BLD CALC: 39.8 % — SIGNIFICANT CHANGE UP (ref 34.5–45)
HGB BLD-MCNC: 12.5 G/DL — SIGNIFICANT CHANGE UP (ref 11.5–15.5)
INR BLD: 1.01 RATIO — SIGNIFICANT CHANGE UP (ref 0.88–1.16)
MCHC RBC-ENTMCNC: 27.5 PG — SIGNIFICANT CHANGE UP (ref 27–34)
MCHC RBC-ENTMCNC: 31.4 GM/DL — LOW (ref 32–36)
MCV RBC AUTO: 87.7 FL — SIGNIFICANT CHANGE UP (ref 80–100)
NRBC # BLD: 0 /100 WBCS — SIGNIFICANT CHANGE UP (ref 0–0)
PLATELET # BLD AUTO: 177 K/UL — SIGNIFICANT CHANGE UP (ref 150–400)
POTASSIUM SERPL-MCNC: 4.5 MMOL/L — SIGNIFICANT CHANGE UP (ref 3.5–5.3)
POTASSIUM SERPL-SCNC: 4.5 MMOL/L — SIGNIFICANT CHANGE UP (ref 3.5–5.3)
PROTHROM AB SERPL-ACNC: 11.7 SEC — SIGNIFICANT CHANGE UP (ref 10.5–13.4)
RBC # BLD: 4.54 M/UL — SIGNIFICANT CHANGE UP (ref 3.8–5.2)
RBC # FLD: 12.8 % — SIGNIFICANT CHANGE UP (ref 10.3–14.5)
RH IG SCN BLD-IMP: NEGATIVE — SIGNIFICANT CHANGE UP
RH IG SCN BLD-IMP: NEGATIVE — SIGNIFICANT CHANGE UP
SODIUM SERPL-SCNC: 139 MMOL/L — SIGNIFICANT CHANGE UP (ref 135–145)
WBC # BLD: 9.15 K/UL — SIGNIFICANT CHANGE UP (ref 3.8–10.5)
WBC # FLD AUTO: 9.15 K/UL — SIGNIFICANT CHANGE UP (ref 3.8–10.5)

## 2023-02-03 RX ORDER — INFLUENZA VIRUS VACCINE 15; 15; 15; 15 UG/.5ML; UG/.5ML; UG/.5ML; UG/.5ML
0.5 SUSPENSION INTRAMUSCULAR ONCE
Refills: 0 | Status: DISCONTINUED | OUTPATIENT
Start: 2023-02-03 | End: 2023-02-04

## 2023-02-03 RX ORDER — SODIUM CHLORIDE 9 MG/ML
3 INJECTION INTRAMUSCULAR; INTRAVENOUS; SUBCUTANEOUS EVERY 8 HOURS
Refills: 0 | Status: DISCONTINUED | OUTPATIENT
Start: 2023-02-03 | End: 2023-02-04

## 2023-02-03 RX ORDER — NITROFURANTOIN MACROCRYSTAL 50 MG
100 CAPSULE ORAL ONCE
Refills: 0 | Status: DISCONTINUED | OUTPATIENT
Start: 2023-02-03 | End: 2023-02-04

## 2023-02-03 RX ORDER — CEFAZOLIN SODIUM 1 G
2000 VIAL (EA) INJECTION ONCE
Refills: 0 | Status: COMPLETED | OUTPATIENT
Start: 2023-02-03 | End: 2023-02-03

## 2023-02-03 RX ORDER — LEVOTHYROXINE SODIUM 125 MCG
1 TABLET ORAL
Qty: 0 | Refills: 0 | DISCHARGE

## 2023-02-03 RX ORDER — OMEPRAZOLE 10 MG/1
1 CAPSULE, DELAYED RELEASE ORAL
Qty: 0 | Refills: 0 | DISCHARGE

## 2023-02-03 RX ORDER — NITROFURANTOIN MACROCRYSTAL 50 MG
1 CAPSULE ORAL
Qty: 0 | Refills: 0 | DISCHARGE

## 2023-02-03 RX ORDER — LEVOTHYROXINE SODIUM 125 MCG
150 TABLET ORAL DAILY
Refills: 0 | Status: DISCONTINUED | OUTPATIENT
Start: 2023-02-03 | End: 2023-02-04

## 2023-02-03 RX ADMIN — SODIUM CHLORIDE 3 MILLILITER(S): 9 INJECTION INTRAMUSCULAR; INTRAVENOUS; SUBCUTANEOUS at 17:03

## 2023-02-03 RX ADMIN — SODIUM CHLORIDE 3 MILLILITER(S): 9 INJECTION INTRAMUSCULAR; INTRAVENOUS; SUBCUTANEOUS at 19:47

## 2023-02-03 NOTE — H&P CARDIOLOGY - HISTORY OF PRESENT ILLNESS
47 year old female with no implantable cardiac device and PMH of HLD, hypothyroidism, obesity s/p gastric sleeve surgery adn neurocardiogenic syncope presents today for leadless pacemaker implant. Pt reports 3 episodes of syncope over the past 3 months. On 12/8 after syncopal episode she was noted to have 21 sec pause.   TTE 11/5/22    1. Normal left ventricular internal dimensions and wall  thicknesses.  2. Normal left ventricular systolic function. No segmental  wall motion abnormalities. Endocardial visualization  enhanced with intravenous injection of Ultrasonic Enhancing  Agent (Lumason).  3. Normal diastolic function  4. Normal right ventricular size and function.  EF 65%    Exercise ET July 7, 2021   NEW ARRHYTHMIAS DEVELOPED DURING/AFTER STRESS:  Rare VPDs occurred during stress.  ------------------------------------------------------------------------  STRESS TEST IMPRESSIONS:  Exercise capacity: 7 METS, Poor for age and gender.  Chest Pain: No chest pain with exercise.  Symptom: Fatigue.  HR Response: Appropriate.  BP Response: Appropriate.  Heart Rhythm: Sinus Rhythm - 83 BPM.  Baseline ECG: Nonspecific ST-T wave abnormality.  ECG Changes: ST Depression: Approximately 0.5 mm  horizontal in leads II, III, aVF, and V3-V6 started at  3:00 min of exercise at HR of 107 bpm.  At 5:00 min of  recovery at 110 bpm, the ST depressions in these leads  became approximately 0.5 mm downsloping and  persisted  11:19 min into recovery.  Arrhythmia: Rare VPDs occurred during stress. 47 year old female with no implantable cardiac device and PMH of HLD, hypothyroidism, obesity s/p gastric sleeve surgery and  neurocardiogenic syncope presents today for leadless pacemaker implant. Pt reports 3 episodes of syncope over the past 3 months. On 12/8 after syncopal episode she was noted to have 21 sec pause. Evaluated by Dr Forrest and Leadless pacemaker recommended   TTE 11/5/22    1. Normal left ventricular internal dimensions and wall  thicknesses.  2. Normal left ventricular systolic function. No segmental  wall motion abnormalities. Endocardial visualization  enhanced with intravenous injection of Ultrasonic Enhancing  Agent (Lumason).  3. Normal diastolic function  4. Normal right ventricular size and function.  EF 65%    Exercise ET July 7, 2021   NEW ARRHYTHMIAS DEVELOPED DURING/AFTER STRESS:  Rare VPDs occurred during stress.  ------------------------------------------------------------------------  STRESS TEST IMPRESSIONS:  Exercise capacity: 7 METS, Poor for age and gender.  Chest Pain: No chest pain with exercise.  Symptom: Fatigue.  HR Response: Appropriate.  BP Response: Appropriate.  Heart Rhythm: Sinus Rhythm - 83 BPM.  Baseline ECG: Nonspecific ST-T wave abnormality.  ECG Changes: ST Depression: Approximately 0.5 mm  horizontal in leads II, III, aVF, and V3-V6 started at  3:00 min of exercise at HR of 107 bpm.  At 5:00 min of  recovery at 110 bpm, the ST depressions in these leads  became approximately 0.5 mm downsloping and  persisted  11:19 min into recovery.  Arrhythmia: Rare VPDs occurred during stress. 47 year old female with no implantable cardiac device and PMH of HLD, hypothyroidism, obesity s/p gastric sleeve surgery, chronic UTI (on daily macrobid)  and  neurocardiogenic syncope presents today for leadless pacemaker implant. Pt reports 3 episodes of syncope over the past 3 months. On 12/8 after syncopal episode she was noted to have 21 sec pause. Evaluated by Dr Forrest and Leadless pacemaker recommended   TTE 11/5/22    1. Normal left ventricular internal dimensions and wall  thicknesses.  2. Normal left ventricular systolic function. No segmental  wall motion abnormalities. Endocardial visualization  enhanced with intravenous injection of Ultrasonic Enhancing  Agent (Lumason).  3. Normal diastolic function  4. Normal right ventricular size and function.  EF 65%    Exercise ET July 7, 2021   NEW ARRHYTHMIAS DEVELOPED DURING/AFTER STRESS:  Rare VPDs occurred during stress.  ------------------------------------------------------------------------  STRESS TEST IMPRESSIONS:  Exercise capacity: 7 METS, Poor for age and gender.  Chest Pain: No chest pain with exercise.  Symptom: Fatigue.  HR Response: Appropriate.  BP Response: Appropriate.  Heart Rhythm: Sinus Rhythm - 83 BPM.  Baseline ECG: Nonspecific ST-T wave abnormality.  ECG Changes: ST Depression: Approximately 0.5 mm  horizontal in leads II, III, aVF, and V3-V6 started at  3:00 min of exercise at HR of 107 bpm.  At 5:00 min of  recovery at 110 bpm, the ST depressions in these leads  became approximately 0.5 mm downsloping and  persisted  11:19 min into recovery.  Arrhythmia: Rare VPDs occurred during stress.

## 2023-02-03 NOTE — PACU DISCHARGE NOTE - PAIN:
Problem: Patient Care Overview  Goal: Plan of Care Review  Outcome: Ongoing (interventions implemented as appropriate)  Vital signs stable, no signs of distress, breastfeeding well, voiding and stooling, discuss POC with mom, mom verbalized understanding.       Controlled with current regime

## 2023-02-03 NOTE — PRE-OP CHECKLIST - NSWEIGHTCALCTOOLDRUG_GEN_A_CORE
----- Message from Darnell Velazquez sent at 6/18/2020 12:18 PM CDT -----  Regarding: Needs call back from nurse  Contact: Marisol Kerr  Pt says she received a missed call from the office. She would like for the nurse to give her a call back because she has a question for the nurse.   Pt# [phone]      used

## 2023-02-03 NOTE — PRE-ANESTHESIA EVALUATION ADULT - NSANTHPMHFT_GEN_ALL_CORE
47 year old female with no implantable cardiac device and PMH of HLD, hypothyroidism, obesity s/p gastric sleeve surgery and  neurocardiogenic syncope presents today for leadless pacemaker implant. Pt reports 3 episodes of syncope over the past 3 months. On 12/8 after syncopal episode she was noted to have 21 sec pause.

## 2023-02-03 NOTE — CHART NOTE - NSCHARTNOTEFT_GEN_A_CORE
Removal of Femoral Suture    Pulses in the right lower extremity are palpable. The patient was placed in the supine position. The insertion site was identified and the sutures were removed per protocol.  Direct pressure was applied for 10 minutes.     Monitoring of the right groin and both lower extremities including neuro-vascular checks and vital signs every 15 minutes x 4, then every 30 minutes x 2, then every 1 hour was ordered.    Complications: None    Comments: Patient instructed on post suture removal precautions, verbalized understanding

## 2023-02-03 NOTE — PATIENT PROFILE ADULT - HOME ACCESSIBILITY CONCERNS
Patient : Tacho Bello Age: 4 year old Sex: female   MRN: [id number] Encounter Date: 11/6/2018      History   CHIEF COMPLAINT    Chief Complaint   Patient presents with   • Breathing Problem       HPI    Tacho Bello is a 4 year old female with a PMH of asthma who presents to the ED with complaints of nonproductive cough and shortness of breath since yesterday. Mother has been giving her nebulizer at home with no improvement so she brought her in to be evaluated. States that her last exacerbation requiring prednisone was over a year ago. Has been hospitalized in the past. He required tracheostomy when she was an infant. No fevers. No sore throat or nasal congestion. Mother states that she usually flares up with changes of the weather. No abdominal pain. Had 2 episodes of nonbloody nonbilious posttussive emesis. No rash.      ALLERGIES:  No Known Allergies    Discharge Medication List as of 11/6/2018  4:56 PM      CONTINUE these medications which have NOT CHANGED    Details   IPRATROPIUM-ALBUTEROL IN Historical Med      erythromycin (ILOTYCIN) ophthalmic ointment Apply ribbon to lower Right eye up to 4 times a dayDisp-3.5 g, R-0, Normal      triamcinolone (KENALOG) 0.025 % cream Apply 1 application topically Daily.Historical Med             Discharge Medication List as of 11/6/2018  4:56 PM      START taking these medications    Details   albuterol 108 (90 Base) MCG/ACT inhaler Inhale 2 puffs into the lungs every 4 hours as needed for Shortness of Breath or Wheezing (Use with spacer chamber).Eprescribe, Disp-8.5 g, R-0      albuterol (ACCUNEB) 1.25 MG/3ML nebulizer solution Take 3 mLs by nebulization every 4 hours as needed for Wheezing or Shortness of Breath.Eprescribe, Nebulization, EVERY 4 HOURS PRN, Disp-75 mL, R-0Each vial = 3 mL (1.25 mg). Each box = 25 vials (75 mL).      Enter dispense quantity of 75 mL per box.      375 mL = Quantity Sufficient for 30 days with every 6 hour dosing.      prednisoLONE  sod-phos (ORAPRED) 15 MG/5ML solution Take 9 mLs by mouth daily (with breakfast) for 4 days.Eprescribe, Disp-36 mL, R-0             Past Medical History:   Diagnosis Date   • RAD (reactive airway disease)        Past Surgical History:   Procedure Laterality Date   • Tracheal surgery     • Tracheal surgery         No family history on file.    Social History     Tobacco Use   • Smoking status: Not on file   Substance Use Topics   • Alcohol use: Not on file   • Drug use: Not on file       Constitutional:  Denies fever or chills   HENT:  Denies nasal congestion or sore throat   Respiratory:  See HPI  Cardiovascular:  Denies chest pain, palpitations  GI:  See HPI  Musculoskeletal:  Denies back pain   Integument:  Denies rash   Neurologic:  Denies headache   Lymphatic:  Denies swollen glands       Physical Exam     Vitals:    11/06/18 1358 11/06/18 1416 11/06/18 1454 11/06/18 1628   BP:       Pulse: 138  144 146   Resp:       Temp:       TempSrc:       SpO2: 100% 100% 100% 100%   Weight:           Physical Exam   Constitutional: She appears well-developed and well-nourished. She is active. No distress.   HENT:   Head: Normocephalic and atraumatic.   Right Ear: Tympanic membrane, external ear, pinna and canal normal.   Left Ear: Tympanic membrane, external ear, pinna and canal normal.   Nose: Congestion present.   Mouth/Throat: Mucous membranes are moist. No oral lesions. No oropharyngeal exudate, pharynx swelling, pharynx erythema or pharynx petechiae. Tonsils are 1+ on the right. Tonsils are 1+ on the left. No tonsillar exudate. Oropharynx is clear. Pharynx is normal.   Eyes: Conjunctivae are normal.   Neck: Normal range of motion. Neck supple. No neck adenopathy.   Cardiovascular: Normal rate and regular rhythm. Pulses are palpable.   No murmur heard.  Pulmonary/Chest: There is normal air entry. Accessory muscle usage present. No stridor. Tachypnea noted. Air movement is not decreased. She has wheezes (Inspiratory and  expiratory wheezes throughout). She has no rhonchi. She has no rales. She exhibits retraction.   Abdominal: Soft. Bowel sounds are normal. She exhibits no distension. There is no tenderness.   Neurological: She is alert.   Skin: Skin is warm and dry. Capillary refill takes less than 3 seconds. No rash noted. She is not diaphoretic.   Nursing note and vitals reviewed.            RADIOLOGY    XR Chest PA and Lateral   Final Result   IMPRESSION: No radiographic evidence of acute cardiopulmonary disease.                   LABS    No results found for this visit on 11/06/18.      PROCEDURES          ED Course   4-year-old female presents the ER with cough and shortness of breath consistent with her previous exacerbations of asthma, diffuse wheezing throughout noted, 95% on room air, hour-long continuous breathing treatment ordered as well as prednisone x-ray and reassess.    Change to Observation Status and Indications:  The patient was placed in an ED observation status at 2PM.    Due to the acuity and complexity of the patient's presentation further evaluation and reassessment will be required to determine the need for admission, transfer or safe discharge.    An extended stay is required for:   > repeat evaluations after nebulizer and adjunct therapies for asthma/COPD.      450 PM Recheck on patient. X-ray unremarkable, patient satting 100% on room air and improved aeration throughout after hour-long nebulization with no increased work of breathing noted, talking in complete sentences without any difficulty, cover with Orapred, refill for albuterol nebulizer prescribed and is to follow-up PCP for repeat evaluation, strict return precautions discussed at length and all questions answered. Staffed with Dr. Blas, agrees with workup and plan.  I spoke with Tacho Bello's mom regarding her   diagnosis, results, treatment and follow up. she  was well appearing and had stable vital signs at the time of discharge.  The  treatment plan was explained and understood.  I thoroughly explained the signs and symptoms necessitating an immediate return to the Emergency Room. Patient and mom understands and agrees with plan for discharge. Any questions have been answered.               Impression:  The encounter diagnosis was Acute bronchospasm.    Follow Up:  Donna Mckenzie MD  [address] [place] [id number]  [phone]    Schedule an appointment as soon as possible for a visit      [facility]  [address] [place] [address]  [phone]    As needed, If symptoms worsen       Discharge Medication List as of 11/6/2018  4:56 PM      START taking these medications    Details   albuterol 108 (90 Base) MCG/ACT inhaler Inhale 2 puffs into the lungs every 4 hours as needed for Shortness of Breath or Wheezing (Use with spacer chamber).Eprescribe, Disp-8.5 g, R-0      albuterol (ACCUNEB) 1.25 MG/3ML nebulizer solution Take 3 mLs by nebulization every 4 hours as needed for Wheezing or Shortness of Breath.Eprescribe, Nebulization, EVERY 4 HOURS PRN, Disp-75 mL, R-0Each vial = 3 mL (1.25 mg). Each box = 25 vials (75 mL).      Enter dispense quantity of 75 mL per box.      375 mL = Quantity Sufficient for 30 days with every 6 hour dosing.      prednisoLONE sod-phos (ORAPRED) 15 MG/5ML solution Take 9 mLs by mouth daily (with breakfast) for 4 days.Eprescribe, Disp-36 mL, R-0                 Krames Instructions were provided  Pt is discharged in stable condition.      Signed:  Terry Miramontes PA-C  11/6/2018  5:16 PM  Patient Seen under the supervision of GABY Naik  11/06/18 5018     none

## 2023-02-03 NOTE — H&P CARDIOLOGY - NSICDXPASTMEDICALHX_GEN_ALL_CORE_FT
PAST MEDICAL HISTORY:  Adult hypothyroidism     Adult-onset obesity BMI 37.6    Chronic cystitis on macrobid daily    Gastric band malfunction     History of papilledema Right eye; Followed by an opthomologist - No meds    HLD (hyperlipidemia)      PAST MEDICAL HISTORY:  Adult hypothyroidism     Adult-onset obesity BMI 37.6    Chronic cystitis on macrobid daily    Gastric band malfunction     History of papilledema Right eye; Followed by an opthomologist - No meds    HLD (hyperlipidemia)     Neurocardiogenic syncope

## 2023-02-04 ENCOUNTER — TRANSCRIPTION ENCOUNTER (OUTPATIENT)
Age: 48
End: 2023-02-04

## 2023-02-04 VITALS
OXYGEN SATURATION: 100 % | DIASTOLIC BLOOD PRESSURE: 67 MMHG | SYSTOLIC BLOOD PRESSURE: 114 MMHG | RESPIRATION RATE: 18 BRPM | TEMPERATURE: 98 F | HEART RATE: 81 BPM

## 2023-02-04 LAB
ANION GAP SERPL CALC-SCNC: 10 MMOL/L — SIGNIFICANT CHANGE UP (ref 5–17)
BUN SERPL-MCNC: 12 MG/DL — SIGNIFICANT CHANGE UP (ref 7–23)
CALCIUM SERPL-MCNC: 9 MG/DL — SIGNIFICANT CHANGE UP (ref 8.4–10.5)
CHLORIDE SERPL-SCNC: 105 MMOL/L — SIGNIFICANT CHANGE UP (ref 96–108)
CO2 SERPL-SCNC: 25 MMOL/L — SIGNIFICANT CHANGE UP (ref 22–31)
CREAT SERPL-MCNC: 0.59 MG/DL — SIGNIFICANT CHANGE UP (ref 0.5–1.3)
EGFR: 110 ML/MIN/1.73M2 — SIGNIFICANT CHANGE UP
GLUCOSE SERPL-MCNC: 86 MG/DL — SIGNIFICANT CHANGE UP (ref 70–99)
HCT VFR BLD CALC: 40 % — SIGNIFICANT CHANGE UP (ref 34.5–45)
HGB BLD-MCNC: 12.4 G/DL — SIGNIFICANT CHANGE UP (ref 11.5–15.5)
MCHC RBC-ENTMCNC: 27.3 PG — SIGNIFICANT CHANGE UP (ref 27–34)
MCHC RBC-ENTMCNC: 31 GM/DL — LOW (ref 32–36)
MCV RBC AUTO: 87.9 FL — SIGNIFICANT CHANGE UP (ref 80–100)
NRBC # BLD: 0 /100 WBCS — SIGNIFICANT CHANGE UP (ref 0–0)
PLATELET # BLD AUTO: 301 K/UL — SIGNIFICANT CHANGE UP (ref 150–400)
POTASSIUM SERPL-MCNC: 4.3 MMOL/L — SIGNIFICANT CHANGE UP (ref 3.5–5.3)
POTASSIUM SERPL-SCNC: 4.3 MMOL/L — SIGNIFICANT CHANGE UP (ref 3.5–5.3)
RBC # BLD: 4.55 M/UL — SIGNIFICANT CHANGE UP (ref 3.8–5.2)
RBC # FLD: 12.8 % — SIGNIFICANT CHANGE UP (ref 10.3–14.5)
SODIUM SERPL-SCNC: 140 MMOL/L — SIGNIFICANT CHANGE UP (ref 135–145)
WBC # BLD: 12.18 K/UL — HIGH (ref 3.8–10.5)
WBC # FLD AUTO: 12.18 K/UL — HIGH (ref 3.8–10.5)

## 2023-02-04 PROCEDURE — 86850 RBC ANTIBODY SCREEN: CPT

## 2023-02-04 PROCEDURE — 33274 TCAT INSJ/RPL PERM LDLS PM: CPT

## 2023-02-04 PROCEDURE — 85027 COMPLETE CBC AUTOMATED: CPT

## 2023-02-04 PROCEDURE — 86901 BLOOD TYPING SEROLOGIC RH(D): CPT

## 2023-02-04 PROCEDURE — C1769: CPT

## 2023-02-04 PROCEDURE — 85610 PROTHROMBIN TIME: CPT

## 2023-02-04 PROCEDURE — 71046 X-RAY EXAM CHEST 2 VIEWS: CPT | Mod: 26

## 2023-02-04 PROCEDURE — 85730 THROMBOPLASTIN TIME PARTIAL: CPT

## 2023-02-04 PROCEDURE — C1894: CPT

## 2023-02-04 PROCEDURE — 71046 X-RAY EXAM CHEST 2 VIEWS: CPT

## 2023-02-04 PROCEDURE — C1786: CPT

## 2023-02-04 PROCEDURE — 84702 CHORIONIC GONADOTROPIN TEST: CPT

## 2023-02-04 PROCEDURE — C1889: CPT

## 2023-02-04 PROCEDURE — 80048 BASIC METABOLIC PNL TOTAL CA: CPT

## 2023-02-04 PROCEDURE — 36415 COLL VENOUS BLD VENIPUNCTURE: CPT

## 2023-02-04 PROCEDURE — 86900 BLOOD TYPING SEROLOGIC ABO: CPT

## 2023-02-04 PROCEDURE — 93005 ELECTROCARDIOGRAM TRACING: CPT

## 2023-02-04 RX ORDER — OXYCODONE HYDROCHLORIDE 5 MG/1
1 TABLET ORAL
Qty: 12 | Refills: 0
Start: 2023-02-04 | End: 2023-02-06

## 2023-02-04 RX ADMIN — Medication 150 MICROGRAM(S): at 04:45

## 2023-02-04 RX ADMIN — SODIUM CHLORIDE 3 MILLILITER(S): 9 INJECTION INTRAMUSCULAR; INTRAVENOUS; SUBCUTANEOUS at 04:41

## 2023-02-04 NOTE — DISCHARGE NOTE NURSING/CASE MANAGEMENT/SOCIAL WORK - NSDCPEFALRISK_GEN_ALL_CORE
For information on Fall & Injury Prevention, visit: [email or web address] OR  [email or web address] OR  https://www.cdc.gov/steadi/patient.html

## 2023-02-04 NOTE — DISCHARGE NOTE NURSING/CASE MANAGEMENT/SOCIAL WORK - PATIENT PORTAL LINK FT
You can access the FollowMyHealth Patient Portal offered by [facility] by registering at the following website: [email or web address]. By joining [employer]’s FollowMyHealth portal, you will also be able to view your health information using other applications (apps) compatible with our system.

## 2023-02-04 NOTE — DISCHARGE NOTE PROVIDER - NSDCMRMEDTOKEN_GEN_ALL_CORE_FT
Macrobid 100 mg oral capsule: 1 cap(s) orally once a day  omeprazole 40 mg oral delayed release capsule: 1 cap(s) orally once a day, As Needed  Synthroid 150 mcg (0.15 mg) oral tablet: 1 tab(s) orally once a day   Macrobid 100 mg oral capsule: 1 cap(s) orally once a day  omeprazole 40 mg oral delayed release capsule: 1 cap(s) orally once a day, As Needed  oxyCODONE 5 mg oral tablet: 1 tab(s) orally every 6 hours, As Needed -for moderate pain MDD:4  Synthroid 150 mcg (0.15 mg) oral tablet: 1 tab(s) orally once a day

## 2023-02-04 NOTE — PROGRESS NOTE ADULT - ASSESSMENT
HPI: 47 year old female with no implantable cardiac device and PMH of HLD, hypothyroidism, obesity s/p gastric sleeve surgery, chronic UTI (on daily macrobid)  and  neurocardiogenic syncope presents today for leadless pacemaker implant. Pt reports 3 episodes of syncope over the past 3 months. On 12/8 after syncopal episode she was noted to have 21 sec pause. Evaluated by Dr Forrest and Leadless pacemaker recommended       IF groin:  ( right/left) groin w/o bleeding or hematoma.  soft, non tender.  Pulses in the (right/left) lower extremity are (palpable/audible by doppler/absent).   Denies chest pain, denies groin/leg/foot: pain, numbness or tingling       cont DAPT   cont antihypertesives ( specify BB, ACE, etc.), check flow sheet and report BP/HR trend   cont statin   please make sure DAPT is prescibed to pt's preferred pharmacy on dc   *triple therapy? ( if so for how long.. indicate)  * heparin gtt ?   Keep Mg >2 K >4  f/u appt in 2 weeks post dc with oupt cardiologist  for all  general cardiology questions please contact patient's primary cards team   all other care as per primary medicine  team       [facility]  Invasive Cardiology  Ext [phone]     HPI: 47 year old female with no implantable cardiac device and PMH of HLD, hypothyroidism, obesity s/p gastric sleeve surgery, chronic UTI (on daily macrobid)  and  neurocardiogenic syncope presents today for leadless pacemaker implant. Pt reports 3 episodes of syncope over the past 3 months. On 12/8 after syncopal episode she was noted to have 21 sec pause. Evaluated by Dr Forrest and Leadless pacemaker recommended       # Leadless Pacemaker Implant  2/3 s/p leadless pacemaker implant via RFV  right groin w/o bleeding or hematoma. soft, non tender  pulses in the right lower extremity are palpable  pending PA/Lat in AM  pending AM labs  continue telemetry monitoring   Keep Mg >2 K >4  f/u appt wound check appt w/ ACP as scheduled  anticipate discharge planning today  huynh rep to see patient in AM prior to discharge    # Hypothyroid  continue levothyroxine      Silvano ADAMCNP  Invasive Cardiology  Ext [phone]

## 2023-02-04 NOTE — DISCHARGE NOTE PROVIDER - NSDCCPCAREPLAN_GEN_ALL_CORE_FT
PRINCIPAL DISCHARGE DIAGNOSIS  Diagnosis: Syncope  Assessment and Plan of Treatment: s/p leadless pacemaker implant  Your heart rate will be controlled.  Continue with follow-up visits to your electrophysiology team and with your home remote device monitoring (if applicable). Continue your medications as prescribed.      SECONDARY DISCHARGE DIAGNOSES  Diagnosis: HLD (hyperlipidemia)  Assessment and Plan of Treatment: Your LDL cholesterol will be less than 70mg/dL  Continue with your cholesterol medications. Eat a heart healthy diet that is low in saturated fats and salt, and includes whole grains, fruits, vegetables and lean protein; exercise regularly (consult with your physician or cardiologist first); maintain a heart healthy weight; if you smoke - quit (A resource to help you stop smoking is the [facility] – phone number [phone].). Continue to follow with your primary physician or cardiologist.

## 2023-02-04 NOTE — DISCHARGE NOTE PROVIDER - HOSPITAL COURSE
HPI: 47 year old female with no implantable cardiac device and PMH of HLD, hypothyroidism, obesity s/p gastric sleeve surgery, chronic UTI (on daily macrobid) and  neurocardiogenic syncope presents today for leadless pacemaker implant. Pt reports 3 episodes of syncope over the past 3 months. On 12/8 after syncopal episode she was noted to have 21 sec pause. Evaluated by Dr Forrest and Leadless pacemaker recommended     2/4 s/p leadless pacemaker implant, site without hematoma/bleeding. Patient without complaint, hemodynamically stable. Pending discharge in AM HPI: 47 year old female with no implantable cardiac device and PMH of HLD, hypothyroidism, obesity s/p gastric sleeve surgery, chronic UTI (on daily macrobid) and  neurocardiogenic syncope presents today for leadless pacemaker implant. Pt reports 3 episodes of syncope over the past 3 months. On 12/8 after syncopal episode she was noted to have 21 sec pause. Evaluated by Dr Forrest and Leadless pacemaker recommended     2/4 s/p leadless pacemaker implant, site without hematoma/bleeding. Patient without complaint, hemodynamically stable.   XR CHEST PA LAT 2V   ORDERED BY: JENAE CHAPMAN     PROCEDURE DATE:  02/04/2023          INTERPRETATION:  INDICATION: Cardiac arrhythmia. Status post left   pacemaker.    COMPARISON: Chest radiograph 11/4/2022    FINDINGS:  Heart/Vascular: Heart size is within normal limits. Interval placement of   right ventricular Micra pacemaker.  Pulmonary: Clear lungs. No pleural effusion or pneumothorax.  Bones: Degenerative changes of the thoracic spine.    Impression:  Interval placement of right ventricular Micra pacemaker.    cleared for discharge home at this time HPI: 47 year old female with no implantable cardiac device and PMH of HLD, hypothyroidism, obesity s/p gastric sleeve surgery, chronic UTI (on daily macrobid) and  neurocardiogenic syncope presents today for leadless pacemaker implant. Pt reports 3 episodes of syncope over the past 3 months. On 12/8 after syncopal episode she was noted to have 21 sec pause. Evaluated by Dr Forrest and Leadless pacemaker recommended     2/4 s/p leadless pacemaker implant, site without hematoma/bleeding. Patient without complaint, hemodynamically stable.   XR CHEST PA LAT 2V   ORDERED BY: JENAE CHAPMAN     PROCEDURE DATE:  02/04/2023          INTERPRETATION:  INDICATION: Cardiac arrhythmia. Status post left   pacemaker.    COMPARISON: Chest radiograph 11/4/2022    FINDINGS:  Heart/Vascular: Heart size is within normal limits. Interval placement of   right ventricular Micra pacemaker.  Pulmonary: Clear lungs. No pleural effusion or pneumothorax.  Bones: Degenerative changes of the thoracic spine.    Impression:  Interval placement of right ventricular leadless pacemaker.    cleared for discharge home at this time     seen and agree  groin site stable, PPM functioning normally,

## 2023-02-04 NOTE — DISCHARGE NOTE PROVIDER - CARE PROVIDER_API CALL
Wound Check, w/ ACP  Cardiology Clinic  [address] [place], [place] [id number]  Phone: [phone]  Fax: (   )    -  Scheduled Appointment: 02/24/2023 10:40 AM

## 2023-02-04 NOTE — DISCHARGE NOTE PROVIDER - PROVIDER TOKENS
FREE:[LAST:[Wound Check],FIRST:[w/ ACP],PHONE:[phone]],FAX:[(   )    -],ADDRESS:[[facility] Clinic  [address] [place] [address]],SCHEDULEDAPPT:[02/24/2023],SCHEDULEDAPPTTIME:[10:40 AM]]

## 2023-02-04 NOTE — DISCHARGE NOTE PROVIDER - NSDCFUADDINST_GEN_ALL_CORE_FT
WOUND CARE:   the DAY AFTER your procedure: remove the bandage at the site, GENTLY clean with mild soap and water, and pat dry, leave OPEN TO AIR  - you may shower  - DO NOT apply lotions, powders, ointments, or perfumes  - check groin every day.  A small amount of soreness and bruising is normal, small pump ( nickel size) is normal  - DO NOT SOAK site for 1 week ( no baths, no pools, no tubs, etc...)    ACTIVITY:  Your procedure was done through your groin  for the next 5 DAYS:  - Limit climbing stairs, no strenuous activity, pushing , pulling, or straining   DO NOT LIFT anything 10 lbs or heavier   you may resume sexual activity in 7 days, unless instructed otherwise    Follow heart healthy diet recommended by your doctor, , if you smoke STOP SMOKING ( may call [phone] for center of tobacco control if you need assistance).  for the next 24 hours:   - stay at home and rest, do not drive or operate heavy machinery   do not drink alcoholic beverages   do not make important personal or business decisions     ***CALL YOUR DOCTOR ***  IF you have fever, chills, body aches, or severe pain, swelling, redness, heat, yellow drainage from your incision site  IF bleeding  or significant new swelling from your puncture site  IF your experience lightheadedness, dizziness, or fainting spell.  IF unable to ge tin contact with your doctor, you may call the Cardiology Office at [facility] at [phone]

## 2023-02-04 NOTE — PROGRESS NOTE ADULT - SUBJECTIVE AND OBJECTIVE BOX
CHIEF COMPLAINT: Patient is a 47y old  Female who presents with a chief complaint of syncope over the past 3 months    HPI: 47 year old female with no implantable cardiac device and PMH of HLD, hypothyroidism, obesity s/p gastric sleeve surgery, chronic UTI (on daily macrobid)  and  neurocardiogenic syncope presents today for leadless pacemaker implant. Pt reports 3 episodes of syncope over the past 3 months. On 12/8 after syncopal episode she was noted to have 21 sec pause. Evaluated by Dr Forrest and Leadless pacemaker recommended   TTE 11/5/22    1. Normal left ventricular internal dimensions and wall  thicknesses.  2. Normal left ventricular systolic function. No segmental  wall motion abnormalities. Endocardial visualization  enhanced with intravenous injection of Ultrasonic Enhancing  Agent (Lumason).  3. Normal diastolic function  4. Normal right ventricular size and function.  EF 65%    Exercise ET July 7, 2021   NEW ARRHYTHMIAS DEVELOPED DURING/AFTER STRESS:  Rare VPDs occurred during stress.  ------------------------------------------------------------------------  STRESS TEST IMPRESSIONS:  Exercise capacity: 7 METS, Poor for age and gender.  Chest Pain: No chest pain with exercise.  Symptom: Fatigue.  HR Response: Appropriate.  BP Response: Appropriate.  Heart Rhythm: Sinus Rhythm - 83 BPM.  Baseline ECG: Nonspecific ST-T wave abnormality.  ECG Changes: ST Depression: Approximately 0.5 mm  horizontal in leads II, III, aVF, and V3-V6 started at  3:00 min of exercise at HR of 107 bpm.  At 5:00 min of  recovery at 110 bpm, the ST depressions in these leads  became approximately 0.5 mm downsloping and  persisted  11:19 min into recovery.  Arrhythmia: Rare VPDs occurred during stress.       Subjective/Observations: patient seen and examined.  denies chest pain, dyspena, dizziness, palpitations, N&V, HA      Review of Systems all WNL except below indicated:    Constitutional: [ ] Fever [ ] Chills [ ] Fatigue [ ] Weight change   HEENT: [ ] Blurred vision [ ] Eye Pain [ ] Headache [ ] Runny nose [ ] Sore Throat   Respiratory: [ ] Cough [ ] Wheezing [ ] Shortness of breath  Cardiovascular: [ ] Chest Pain [ ] Palpitations [ ] CROW [ ] PND [ ] Orthopnea  Gastrointestinal: [ ] Abdominal Pain [ ] Diarrhea [ ] Constipation [ ] Hemorrhoids [ ] Nausea [ ] Vomiting  Genitourinary: [ ] Nocturia [ ] Dysuria [ ] Incontinence  Extremities: [ ] Swelling [ ] Joint Pain  Neurologic: [ ] Focal deficit [ ] Paresthesias [ ] Syncope  Lymphatic: [ ] Swelling [ ] Lymphadenopathy   Skin: [ ] Rash [ ] Ecchymoses [ ] Wounds [ ] Lesions  Psychiatry: [ ] Depression [ ] Suicidal/Homicidal Ideation [ ] Anxiety [ ] Sleep Disturbances  [ ] 10 point review of systems is otherwise negative except as mentioned above            [ ]Unable to obtain      PAST MEDICAL & SURGICAL HISTORY:  Adult hypothyroidism    Adult-onset obesity  BMI 37.6    Gastric band malfunction    Chronic cystitis  on macrobid daily    History of papilledema  Right eye; Followed by an opthomologist - No meds    HLD (hyperlipidemia)    Neurocardiogenic syncope    History of cholecystectomy  possibly 2005    Hx of laparoscopic gastric banding  around 2008    S/P abdominoplasty  2012    History of suburethral sling procedure  2010      MEDICATIONS  (STANDING):  influenza   Vaccine 0.5 milliLiter(s) IntraMuscular once  levothyroxine 150 MICROGram(s) Oral daily  nitrofurantoin monohydrate/macrocrystals (MACROBID) 100 milliGRAM(s) Oral once  sodium chloride 0.9% lock flush 3 milliLiter(s) IV Push every 8 hours    MEDICATIONS  (PRN):    Allergies    Bactrim (Pruritus)  sulfADIAZINE (Unknown)    Intolerances      Vital Signs Last 24 Hrs  T(C): 36.6 (04 Feb 2023 04:45), Max: 36.8 (03 Feb 2023 10:43)  T(F): 97.9 (04 Feb 2023 04:45), Max: 98.3 (03 Feb 2023 10:43)  HR: 75 (04 Feb 2023 04:45) (63 - 104)  BP: 93/57 (04 Feb 2023 04:45) (93/57 - 131/83)  BP(mean): 69 (04 Feb 2023 04:45) (69 - 101)  RR: 18 (04 Feb 2023 04:45) (16 - 19)  SpO2: 97% (04 Feb 2023 04:45) (94% - 99%)    Parameters below as of 04 Feb 2023 04:45  Patient On (Oxygen Delivery Method): room air    I&O's Summary    03 Feb 2023 07:01  -  04 Feb 2023 05:55  --------------------------------------------------------  IN: 240 mL / OUT: 0 mL / NET: 240 mL      Weight (kg): 78 (02-03 @ 13:18)    FOCUSED PHYSICAL EXAM:  Pulmonary: Non-labored, breath sounds are clear bilaterally, No wheezing, rales or rhonchi  Cardiovascular: Regular, S1 and S2, No murmurs, rubs, gallops or clicks      LABS: All Labs Reviewed:                        12.4   12.18 )-----------( 301      ( 04 Feb 2023 01:13 )             40.0                         12.5   9.15  )-----------( 177      ( 03 Feb 2023 10:50 )             39.8     04 Feb 2023 01:12    140    |  105    |  12     ----------------------------<  86     4.3     |  25     |  0.59   03 Feb 2023 10:50    139    |  105    |  13     ----------------------------<  92     4.5     |  23     |  0.53     Ca    9.0        04 Feb 2023 01:12  Ca    9.0        03 Feb 2023 10:50    PT/INR - ( 03 Feb 2023 10:50 )   PT: 11.7 sec;   INR: 1.01 ratio      PTT - ( 03 Feb 2023 10:50 )  PTT:23.0 sec      RESULTS:    TELE interpretation: SR 60s    ECG:   Ventricular Rate 68 BPM    Atrial Rate 68 BPM    P-R Interval 172 ms    QRS Duration 82 ms    Q-T Interval 422 ms    QTC Calculation(Bazett) 448 ms    P Axis 40 degrees    R Axis -8 degrees    T Axis 19 degrees    Diagnosis Line NORMAL SINUS RHYTHM  POSSIBLE ANTERIOR INFARCT , AGE UNDETERMINED  ABNORMAL ECG  WHEN COMPARED WITH ECG OF 04-NOV-2022 13:43,  NO SIGNIFICANT CHANGE WAS FOUND  Confirmed by Roc Fournier MD ([id number]) on 2/3/2023 3:25:24 PM    ECHO:  TTE 11/5/22  1. Normal left ventricular internal dimensions and wall  thicknesses.  2. Normal left ventricular systolic function. No segmental  wall motion abnormalities. Endocardial visualization  enhanced with intravenous injection of Ultrasonic Enhancing  Agent (Lumason).  3. Normal diastolic function  4. Normal right ventricular size and function.  EF 65%    STRESS TEST:  Exercise ET July 7, 2021   NEW ARRHYTHMIAS DEVELOPED DURING/AFTER STRESS:  Rare VPDs occurred during stress.  ------------------------------------------------------------------------  STRESS TEST IMPRESSIONS:  Exercise capacity: 7 METS, Poor for age and gender.  Chest Pain: No chest pain with exercise.  Symptom: Fatigue.  HR Response: Appropriate.  BP Response: Appropriate.  Heart Rhythm: Sinus Rhythm - 83 BPM.  Baseline ECG: Nonspecific ST-T wave abnormality.  ECG Changes: ST Depression: Approximately 0.5 mm  horizontal in leads II, III, aVF, and V3-V6 started at  3:00 min of exercise at HR of 107 bpm.  At 5:00 min of  recovery at 110 bpm, the ST depressions in these leads  became approximately 0.5 mm downsloping and  persisted  11:19 min into recovery.  Arrhythmia: Rare VPDs occurred during stress.

## 2023-02-04 NOTE — DISCHARGE NOTE PROVIDER - NSDCCPTREATMENT_GEN_ALL_CORE_FT
PRINCIPAL PROCEDURE  Procedure: Implantation of leadless pacemaker  Findings and Treatment: Study Date:     02/03/2023   Name:           SERENITY SINGER   Leadless Pacemaker Insertion Report   Electrophysiologist:           Katelynn Forrest MD   Brief Clinical History   Serenity Singer is a 47 year old woman with a history of  hypothyroidism, post gastric sleeve placement and  neurocardiogenic syncope. She had a syncopal episode while wearing a  MCOT, which demonstrated a 21  second sinus pause. Her TTE from November 2022 shows normal LV size  and function with LVEF65%. She  has not started paroxetine 25mg QD. She presents to the EP lab today  for implantation of an Abbott leadless  pacemaker.   Indications   Syncope   Sinus node dysfunction with symptomatic bradycardia   Primary ICD-10   R55 - Syncope and collapse   Secondary ICD-10   I49.5 - Sick sinus syndrome   CPT Code:                  73174 - Leadless pacemaker insertion   Procedures Performed   Primary Procedures:      Leadless VR pacemaker implant   Conclusions   Successful implantation of an Aveir VR leadless pacemaker

## 2023-02-06 RX ORDER — OMEPRAZOLE 10 MG/1
1 CAPSULE, DELAYED RELEASE ORAL
Qty: 0 | Refills: 0 | DISCHARGE

## 2023-02-24 ENCOUNTER — APPOINTMENT (OUTPATIENT)
Dept: ELECTROPHYSIOLOGY | Facility: CLINIC | Age: 48
End: 2023-02-24
Payer: COMMERCIAL

## 2023-02-24 ENCOUNTER — NON-APPOINTMENT (OUTPATIENT)
Age: 48
End: 2023-02-24

## 2023-02-24 VITALS
BODY MASS INDEX: 31.65 KG/M2 | HEIGHT: 62 IN | OXYGEN SATURATION: 97 % | DIASTOLIC BLOOD PRESSURE: 83 MMHG | HEART RATE: 86 BPM | WEIGHT: 172 LBS | SYSTOLIC BLOOD PRESSURE: 118 MMHG

## 2023-02-24 PROBLEM — R55 SYNCOPE AND COLLAPSE: Chronic | Status: ACTIVE | Noted: 2023-02-03

## 2023-02-24 PROBLEM — E78.5 HYPERLIPIDEMIA, UNSPECIFIED: Chronic | Status: ACTIVE | Noted: 2023-02-03

## 2023-02-24 PROCEDURE — 99024 POSTOP FOLLOW-UP VISIT: CPT

## 2023-02-24 PROCEDURE — 93279 PRGRMG DEV EVAL PM/LDLS PM: CPT

## 2023-02-24 PROCEDURE — 93000 ELECTROCARDIOGRAM COMPLETE: CPT | Mod: 59

## 2023-05-09 ENCOUNTER — APPOINTMENT (OUTPATIENT)
Dept: OPHTHALMOLOGY | Facility: CLINIC | Age: 48
End: 2023-05-09

## 2023-05-10 ENCOUNTER — NON-APPOINTMENT (OUTPATIENT)
Age: 48
End: 2023-05-10

## 2023-05-10 ENCOUNTER — APPOINTMENT (OUTPATIENT)
Dept: OPHTHALMOLOGY | Facility: CLINIC | Age: 48
End: 2023-05-10
Payer: COMMERCIAL

## 2023-05-10 PROCEDURE — 92012 INTRM OPH EXAM EST PATIENT: CPT

## 2023-06-05 ENCOUNTER — APPOINTMENT (OUTPATIENT)
Dept: ELECTROPHYSIOLOGY | Facility: CLINIC | Age: 48
End: 2023-06-05

## 2023-06-05 VITALS — SYSTOLIC BLOOD PRESSURE: 120 MMHG | DIASTOLIC BLOOD PRESSURE: 85 MMHG

## 2023-06-19 ENCOUNTER — APPOINTMENT (OUTPATIENT)
Dept: ELECTROPHYSIOLOGY | Facility: CLINIC | Age: 48
End: 2023-06-19
Payer: COMMERCIAL

## 2023-06-19 ENCOUNTER — NON-APPOINTMENT (OUTPATIENT)
Age: 48
End: 2023-06-19

## 2023-06-19 VITALS
SYSTOLIC BLOOD PRESSURE: 120 MMHG | HEART RATE: 61 BPM | DIASTOLIC BLOOD PRESSURE: 86 MMHG | OXYGEN SATURATION: 98 % | WEIGHT: 176 LBS | HEIGHT: 62 IN | BODY MASS INDEX: 32.39 KG/M2

## 2023-06-19 PROCEDURE — 93279 PRGRMG DEV EVAL PM/LDLS PM: CPT

## 2023-06-19 PROCEDURE — 93000 ELECTROCARDIOGRAM COMPLETE: CPT | Mod: 59

## 2023-07-17 PROBLEM — Z13.29 SCREENING FOR OTHER AND UNSPECIFIED ENDOCRINE, NUTRITIONAL, METABOLIC AND IMMUNITY DISORDERS: Status: ACTIVE | Noted: 2023-07-17

## 2023-07-17 NOTE — ASSESSMENT
[___ Years Post Op] : [unfilled] years [Previous Visit Weight: ___] : Previous visit weight: [unfilled] lbs [Cormobidities: ___] : Comorbidities: [unfilled]

## 2023-07-17 NOTE — HISTORY OF PRESENT ILLNESS
[Procedure: ___] : Procedure performed: [unfilled]  [Date of Surgery: ___] : Date of Surgery:   [unfilled] [Surgeon Name:   ___] : Surgeon Name: Dr. DARLING [Pre-Op Weight ___] : Pre-op weight was [unfilled] lbs [___ Years Post Op] : [unfilled] years [de-identified] : Had leadless pacemaker placed on 2/3/2023  [Diabetes] : Diabetes [Hypertension] : no Hypertension [Sleep Apnea] : no Sleep Apnea [Hyperlipidemia] : no Hyperlipidemia

## 2023-07-20 ENCOUNTER — APPOINTMENT (OUTPATIENT)
Dept: SURGERY | Facility: CLINIC | Age: 48
End: 2023-07-20

## 2023-07-20 DIAGNOSIS — Z13.0 ENCOUNTER FOR SCREENING FOR OTHER SUSPECTED ENDOCRINE DISORDER: ICD-10-CM

## 2023-07-20 DIAGNOSIS — Z13.228 ENCOUNTER FOR SCREENING FOR OTHER SUSPECTED ENDOCRINE DISORDER: ICD-10-CM

## 2023-07-20 DIAGNOSIS — Z13.29 ENCOUNTER FOR SCREENING FOR OTHER SUSPECTED ENDOCRINE DISORDER: ICD-10-CM

## 2023-07-27 NOTE — PATIENT PROFILE ADULT - NSPROPTRIGHTREPPHONE_GEN_A_NUR
[id number] Tranexamic Acid Pregnancy And Lactation Text: It is unknown if this medication is safe during pregnancy or breast feeding.

## 2023-08-25 NOTE — H&P ADULT - NSHPPOAPULMEMBOLUS_GEN_A_CORE
no Pinch Graft Text: The defect edges were debeveled with a #15 scalpel blade. Given the location of the defect, shape of the defect and the proximity to free margins a pinch graft was deemed most appropriate. Using a sterile surgical marker, the primary defect shape was transferred to the donor site. The area thus outlined was incised deep to adipose tissue with a #15 scalpel blade.  The harvested graft was then trimmed of adipose tissue until only dermis and epidermis was left. The skin margins of the secondary defect were undermined to an appropriate distance in all directions utilizing iris scissors.  The secondary defect was closed with interrupted buried subcutaneous sutures.  The skin edges were then re-apposed with running  sutures.  The skin graft was then placed in the primary defect and oriented appropriately.

## 2023-12-23 ENCOUNTER — RX RENEWAL (OUTPATIENT)
Age: 48
End: 2023-12-23

## 2023-12-23 RX ORDER — OMEPRAZOLE 40 MG/1
40 CAPSULE, DELAYED RELEASE ORAL
Qty: 30 | Refills: 11 | Status: ACTIVE | COMMUNITY
Start: 2022-03-11 | End: 1900-01-01

## 2023-12-25 ENCOUNTER — RESULT CHARGE (OUTPATIENT)
Age: 48
End: 2023-12-25

## 2023-12-26 ENCOUNTER — APPOINTMENT (OUTPATIENT)
Dept: ELECTROPHYSIOLOGY | Facility: CLINIC | Age: 48
End: 2023-12-26
Payer: COMMERCIAL

## 2023-12-26 ENCOUNTER — NON-APPOINTMENT (OUTPATIENT)
Age: 48
End: 2023-12-26

## 2023-12-26 VITALS
HEART RATE: 84 BPM | OXYGEN SATURATION: 96 % | DIASTOLIC BLOOD PRESSURE: 73 MMHG | HEIGHT: 62 IN | BODY MASS INDEX: 33.13 KG/M2 | SYSTOLIC BLOOD PRESSURE: 107 MMHG | WEIGHT: 180 LBS

## 2023-12-26 PROCEDURE — 93279 PRGRMG DEV EVAL PM/LDLS PM: CPT

## 2023-12-26 PROCEDURE — 93000 ELECTROCARDIOGRAM COMPLETE: CPT | Mod: 59

## 2023-12-26 PROCEDURE — 93000 ELECTROCARDIOGRAM COMPLETE: CPT

## 2024-06-26 ENCOUNTER — NON-APPOINTMENT (OUTPATIENT)
Age: 49
End: 2024-06-26

## 2024-06-26 ENCOUNTER — APPOINTMENT (OUTPATIENT)
Dept: ELECTROPHYSIOLOGY | Facility: CLINIC | Age: 49
End: 2024-06-26
Payer: COMMERCIAL

## 2024-06-26 VITALS
HEART RATE: 70 BPM | WEIGHT: 180 LBS | DIASTOLIC BLOOD PRESSURE: 84 MMHG | BODY MASS INDEX: 33.13 KG/M2 | HEIGHT: 62 IN | OXYGEN SATURATION: 96 % | SYSTOLIC BLOOD PRESSURE: 120 MMHG

## 2024-06-26 PROCEDURE — 93000 ELECTROCARDIOGRAM COMPLETE: CPT | Mod: 59

## 2024-06-26 PROCEDURE — 93282 PRGRMG EVAL IMPLANTABLE DFB: CPT

## 2024-09-11 ENCOUNTER — NON-APPOINTMENT (OUTPATIENT)
Age: 49
End: 2024-09-11

## 2024-09-25 ENCOUNTER — NON-APPOINTMENT (OUTPATIENT)
Age: 49
End: 2024-09-25

## 2024-09-25 NOTE — PHYSICAL EXAM
[de-identified] : anicteric, mucous membranes moist  [de-identified] : CTA  [de-identified] : RRR [de-identified] : abdomen soft nontender nondistended [de-identified] : healed

## 2024-09-25 NOTE — HISTORY OF PRESENT ILLNESS
[Procedure: ___] : Procedure performed: [unfilled]  [Surgeon Name:   ___] : Surgeon Name: Dr. DARLING [Pre-Op Weight ___] : Pre-op weight was [unfilled] lbs [___ Years Post Op] : [unfilled] years [Phase 4] : Phase 4 [Walking] : walking [Diabetes] : Diabetes [Date of Surgery: ___] : Date of Surgery:   [unfilled] [de-identified] : Presents for follow up. S/P leadless PPM implant on 2/4/23 for neurocardiogenic syncope. Otherwise feeling well. Maintaining high protein, low carb diet. Taking Vitamins. Normal BMs. Exercising. No reflux.   [Hypertension] : no Hypertension [Sleep Apnea] : no Sleep Apnea [GERD] : no GERD [Hyperlipidemia] : no Hyperlipidemia

## 2024-09-25 NOTE — ASSESSMENT
[Cormobidities: ___] : Comorbidities: [unfilled] [___ Years Post Op] : [unfilled] years [Previous Visit Weight: ___] : Previous visit weight: [unfilled] lbs [de-identified] : Plan: Continue high-protein low-carb diet Consider pacemaker placement as recommended by EP MD Increase activities as tolerated and permitted by cardiology s/p PPM Check blood work Continue vitamins daily Follow-up 6 months

## 2024-09-26 NOTE — ASSESSMENT
[de-identified] : Plan: Continue high-protein low-carb diet Consider pacemaker placement as recommended by EP MD Increase activities as tolerated and permitted by cardiology s/p PPM Check blood work Continue vitamins daily Follow-up 6 months

## 2024-09-26 NOTE — HISTORY OF PRESENT ILLNESS
[de-identified] : Presents for follow up. S/P leadless PPM implant on 2/4/23 for neurocardiogenic syncope. Otherwise feeling well. Maintaining high protein, low carb diet. Taking Vitamins. Normal BMs. Exercising. No reflux.   [Hypertension] : no Hypertension [Sleep Apnea] : no Sleep Apnea [GERD] : no GERD [Hyperlipidemia] : no Hyperlipidemia

## 2024-09-26 NOTE — PHYSICAL EXAM
[de-identified] : anicteric, mucous membranes moist  [de-identified] : CTA  [de-identified] : RRR [de-identified] : abdomen soft nontender nondistended [de-identified] : healed

## 2024-10-03 ENCOUNTER — APPOINTMENT (OUTPATIENT)
Dept: SURGERY | Facility: CLINIC | Age: 49
End: 2024-10-03

## 2024-10-03 DIAGNOSIS — Z98.84 BARIATRIC SURGERY STATUS: ICD-10-CM

## 2024-12-26 ENCOUNTER — RESULT CHARGE (OUTPATIENT)
Age: 49
End: 2024-12-26

## 2024-12-26 ENCOUNTER — APPOINTMENT (OUTPATIENT)
Dept: ELECTROPHYSIOLOGY | Facility: CLINIC | Age: 49
End: 2024-12-26
Payer: COMMERCIAL

## 2024-12-26 ENCOUNTER — NON-APPOINTMENT (OUTPATIENT)
Age: 49
End: 2024-12-26

## 2024-12-26 VITALS
HEIGHT: 62 IN | HEART RATE: 79 BPM | BODY MASS INDEX: 33.13 KG/M2 | DIASTOLIC BLOOD PRESSURE: 80 MMHG | WEIGHT: 180 LBS | OXYGEN SATURATION: 96 % | SYSTOLIC BLOOD PRESSURE: 111 MMHG

## 2024-12-26 PROCEDURE — 93282 PRGRMG EVAL IMPLANTABLE DFB: CPT

## 2024-12-26 PROCEDURE — 93000 ELECTROCARDIOGRAM COMPLETE: CPT | Mod: 59

## 2024-12-26 RX ORDER — LEVOTHYROXINE SODIUM 137 UG/1
137 CAPSULE ORAL DAILY
Refills: 0 | Status: ACTIVE | COMMUNITY
Start: 2024-12-26

## 2025-02-20 ENCOUNTER — APPOINTMENT (OUTPATIENT)
Dept: SURGERY | Facility: CLINIC | Age: 50
End: 2025-02-20
Payer: COMMERCIAL

## 2025-02-20 VITALS
WEIGHT: 190.25 LBS | HEIGHT: 62 IN | SYSTOLIC BLOOD PRESSURE: 137 MMHG | BODY MASS INDEX: 35.01 KG/M2 | DIASTOLIC BLOOD PRESSURE: 88 MMHG | TEMPERATURE: 97.7 F | RESPIRATION RATE: 16 BRPM | OXYGEN SATURATION: 99 % | HEART RATE: 83 BPM

## 2025-02-20 DIAGNOSIS — Z98.84 BARIATRIC SURGERY STATUS: ICD-10-CM

## 2025-02-20 DIAGNOSIS — K21.9 GASTRO-ESOPHAGEAL REFLUX DISEASE W/OUT ESOPHAGITIS: ICD-10-CM

## 2025-02-20 PROCEDURE — 99213 OFFICE O/P EST LOW 20 MIN: CPT

## 2025-02-25 RX ORDER — SEMAGLUTIDE 2.68 MG/ML
8 INJECTION, SOLUTION SUBCUTANEOUS
Qty: 3 | Refills: 2 | Status: ACTIVE | COMMUNITY
Start: 2025-02-20

## 2025-02-27 ENCOUNTER — NON-APPOINTMENT (OUTPATIENT)
Age: 50
End: 2025-02-27

## 2025-03-03 ENCOUNTER — NON-APPOINTMENT (OUTPATIENT)
Age: 50
End: 2025-03-03

## 2025-03-03 ENCOUNTER — APPOINTMENT (OUTPATIENT)
Dept: OPHTHALMOLOGY | Facility: CLINIC | Age: 50
End: 2025-03-03
Payer: COMMERCIAL

## 2025-03-03 PROCEDURE — 92133 CPTRZD OPH DX IMG PST SGM ON: CPT

## 2025-03-03 PROCEDURE — 99214 OFFICE O/P EST MOD 30 MIN: CPT

## 2025-03-03 PROCEDURE — 92083 EXTENDED VISUAL FIELD XM: CPT

## 2025-06-10 ENCOUNTER — NON-APPOINTMENT (OUTPATIENT)
Age: 50
End: 2025-06-10

## 2025-06-19 ENCOUNTER — APPOINTMENT (OUTPATIENT)
Dept: SURGERY | Facility: CLINIC | Age: 50
End: 2025-06-19
Payer: COMMERCIAL

## 2025-06-19 VITALS
SYSTOLIC BLOOD PRESSURE: 131 MMHG | BODY MASS INDEX: 34.48 KG/M2 | OXYGEN SATURATION: 99 % | WEIGHT: 187.38 LBS | HEART RATE: 76 BPM | RESPIRATION RATE: 16 BRPM | TEMPERATURE: 97.3 F | DIASTOLIC BLOOD PRESSURE: 85 MMHG | HEIGHT: 62 IN

## 2025-06-19 PROCEDURE — 99213 OFFICE O/P EST LOW 20 MIN: CPT

## 2025-07-02 ENCOUNTER — APPOINTMENT (OUTPATIENT)
Dept: ELECTROPHYSIOLOGY | Facility: CLINIC | Age: 50
End: 2025-07-02

## 2025-07-02 ENCOUNTER — NON-APPOINTMENT (OUTPATIENT)
Age: 50
End: 2025-07-02

## 2025-07-02 ENCOUNTER — RESULT CHARGE (OUTPATIENT)
Age: 50
End: 2025-07-02

## 2025-07-02 VITALS — DIASTOLIC BLOOD PRESSURE: 85 MMHG | SYSTOLIC BLOOD PRESSURE: 121 MMHG | OXYGEN SATURATION: 97 % | HEART RATE: 76 BPM

## 2025-07-02 PROCEDURE — 93000 ELECTROCARDIOGRAM COMPLETE: CPT | Mod: 59

## 2025-07-02 PROCEDURE — 93279 PRGRMG DEV EVAL PM/LDLS PM: CPT

## 2025-07-30 ENCOUNTER — APPOINTMENT (OUTPATIENT)
Dept: OBGYN | Facility: CLINIC | Age: 50
End: 2025-07-30

## 2025-08-27 ENCOUNTER — APPOINTMENT (OUTPATIENT)
Dept: OPHTHALMOLOGY | Facility: CLINIC | Age: 50
End: 2025-08-27
Payer: COMMERCIAL

## 2025-08-27 ENCOUNTER — NON-APPOINTMENT (OUTPATIENT)
Age: 50
End: 2025-08-27

## 2025-08-27 PROCEDURE — 92133 CPTRZD OPH DX IMG PST SGM ON: CPT

## 2025-08-27 PROCEDURE — 99214 OFFICE O/P EST MOD 30 MIN: CPT

## 2025-08-27 PROCEDURE — 92083 EXTENDED VISUAL FIELD XM: CPT

## 2025-08-27 PROCEDURE — 92015 DETERMINE REFRACTIVE STATE: CPT

## (undated) DEVICE — DRSG STERISTRIPS 0.5 X 4"

## (undated) DEVICE — SUT TICRON 0 30" GS-22

## (undated) DEVICE — DRAPE INSTRUMENT POUCH 6.75" X 11"

## (undated) DEVICE — XI ARM FORCEP TENACULUM

## (undated) DEVICE — DRAPE 3/4 SHEET W REINFORCEMENT 56X77"

## (undated) DEVICE — TAPE SILK 3"

## (undated) DEVICE — TROCAR COVIDIEN VERSASTEP PLUS 15MM STANDARD

## (undated) DEVICE — MEDICATION LABELS W MARKER

## (undated) DEVICE — TROCAR COVIDIEN VERSASTEP PLUS 12MM STANDARD

## (undated) DEVICE — TUBING IRRIGATION DAVOL SYSTEM X STREAM

## (undated) DEVICE — ENDOCATCH II 15MM

## (undated) DEVICE — SHEARS COVIDIEN ENDO SHEAR 5MM X 45CM LONG W MONOPOLAR CAUTERY

## (undated) DEVICE — SUT BIOSYN 4-0 18" P-12

## (undated) DEVICE — GLV 8 PROTEXIS (WHITE)

## (undated) DEVICE — VENODYNE/SCD SLEEVE CALF LARGE

## (undated) DEVICE — XI SEAL UNIV 5- 8 MM

## (undated) DEVICE — DRAPE 1/2 SHEET 40X57"

## (undated) DEVICE — XI ARM PERMANENT CAUTERY SPATULA

## (undated) DEVICE — SOL IRR POUR H2O 250ML

## (undated) DEVICE — Device

## (undated) DEVICE — STAPLER SKIN VISI-STAT 35 WIDE

## (undated) DEVICE — XI ARM NEEDLE DRIVER LARGE

## (undated) DEVICE — XI ARM CLIP APPLIER MEDIUM-LARGE

## (undated) DEVICE — SUT SOFSILK 2-0 18" C-23

## (undated) DEVICE — XI ARM FORCEP MARYLAND BIPOLAR

## (undated) DEVICE — XI ARM FORCEP FENESTRATED BIPOLAR 8MM

## (undated) DEVICE — WARMING BLANKET UPPER ADULT

## (undated) DEVICE — COVIDIEN SIGNIA POWER CONTROL SHELL

## (undated) DEVICE — DRAIN RESERVOIR FOR JACKSON PRATT 100CC CARDINAL

## (undated) DEVICE — XI VESSEL SEALER

## (undated) DEVICE — XI ARM FORCEP PROGRASP 8MM

## (undated) DEVICE — PAD AIRPAL TRANSFER 34" DISP

## (undated) DEVICE — MARKING PEN W RULER

## (undated) DEVICE — VALVE YELLOW PORT SEAL PLUS 5MM

## (undated) DEVICE — SUT POLYSORB 3-0 30" V-20 UNDYED

## (undated) DEVICE — DRAPE MAYO STAND 30"

## (undated) DEVICE — FOLEY TRAY 16FR 5CC LF LUBRISIL ADVANCE TEMP CLOSED

## (undated) DEVICE — GOWN XL EXTRA LONG

## (undated) DEVICE — DRAIN JACKSON PRATT 10MM FLAT FULL NO TROCAR

## (undated) DEVICE — PACK LAP CHOLE LAP APPENDECTOMY

## (undated) DEVICE — INSUFFLATION NDL COVIDIEN STEP 14G FOR STEP/VERSASTEP

## (undated) DEVICE — DRAPE BACK TABLE COVER HEAVY DUTY 60"

## (undated) DEVICE — TUBING STRYKEFLOW II SUCTION / IRRIGATOR

## (undated) DEVICE — SUT POLYSORB 0 30" GS-23

## (undated) DEVICE — PREP CHLORAPREP HI-LITE ORANGE 26ML

## (undated) DEVICE — APPLICATOR DUPLOSPRAY 40CM DISP

## (undated) DEVICE — DRAIN PENROSE .25" X 18" LATEX

## (undated) DEVICE — BLADE SCALPEL SAFETYLOCK #10

## (undated) DEVICE — XI ARM CLIP APPLIER LARGE

## (undated) DEVICE — LUBRICANT INST ELECTROLUBE Z SOLUTION

## (undated) DEVICE — SHEARS COVIDIEN ENDO SHEAR 5MM X 31CM W UNIPOLAR CAUTERY

## (undated) DEVICE — XI ARM GRASPER TIP UP FENESTRATED

## (undated) DEVICE — GOWN TRIMAX LG

## (undated) DEVICE — SPECIMEN CONTAINER 100ML

## (undated) DEVICE — D HELP - CLEARVIEW CLEARIFY SYSTEM

## (undated) DEVICE — XI DRAPE COLUMN

## (undated) DEVICE — XI ARM PERMANENT CAUTERY HOOK

## (undated) DEVICE — IRRIGATION TRAY W PISTON SYRINGE 60ML

## (undated) DEVICE — XI ARM NEEDLE DRIVER SUTURECUT MEGA 8MM

## (undated) DEVICE — NDL HYPO SAFE 22G X 1.5" (BLACK)

## (undated) DEVICE — XI ARM FORCEP CADIERE 8MM

## (undated) DEVICE — XI DRAPE ARM

## (undated) DEVICE — XI OBTURATOR OPTICAL BLADELESS 8MM

## (undated) DEVICE — TUBING INSUFFLATION LAP FILTER 10FT

## (undated) DEVICE — SUT POLYSORB 2-0 30" V-20 UNDYED

## (undated) DEVICE — SUT POLYSORB 0 36" GU-46

## (undated) DEVICE — DRSG MASTISOL

## (undated) DEVICE — POSITIONER FOAM EGG CRATE ULNAR 2PCS (PINK)

## (undated) DEVICE — BLADE SCALPEL SAFETYLOCK #15

## (undated) DEVICE — XI ARM SCISSOR MONO CURVED

## (undated) DEVICE — XI ARM DISSECTOR CURVED BIPOLAR 8MM

## (undated) DEVICE — DRAPE TOWEL BLUE 17" X 24"

## (undated) DEVICE — VISIGI 3D SLEEVE GASTRECTOMY 36FR

## (undated) DEVICE — SOL IRR POUR NS 0.9% 500ML